# Patient Record
Sex: FEMALE | Race: WHITE | NOT HISPANIC OR LATINO | Employment: OTHER | URBAN - METROPOLITAN AREA
[De-identification: names, ages, dates, MRNs, and addresses within clinical notes are randomized per-mention and may not be internally consistent; named-entity substitution may affect disease eponyms.]

---

## 2024-10-08 ENCOUNTER — OFFICE VISIT (OUTPATIENT)
Age: 84
End: 2024-10-08
Payer: MEDICARE

## 2024-10-08 VITALS
DIASTOLIC BLOOD PRESSURE: 68 MMHG | SYSTOLIC BLOOD PRESSURE: 134 MMHG | RESPIRATION RATE: 17 BRPM | TEMPERATURE: 98.1 F | HEIGHT: 66 IN | WEIGHT: 139.4 LBS | BODY MASS INDEX: 22.4 KG/M2 | HEART RATE: 72 BPM | OXYGEN SATURATION: 97 %

## 2024-10-08 DIAGNOSIS — Z23 ENCOUNTER FOR IMMUNIZATION: ICD-10-CM

## 2024-10-08 DIAGNOSIS — D50.0 IRON DEFICIENCY ANEMIA DUE TO CHRONIC BLOOD LOSS: ICD-10-CM

## 2024-10-08 DIAGNOSIS — I10 PRIMARY HYPERTENSION: ICD-10-CM

## 2024-10-08 DIAGNOSIS — C44.92 CANCER OF SKIN, SQUAMOUS CELL: ICD-10-CM

## 2024-10-08 DIAGNOSIS — Z78.0 POSTMENOPAUSAL: ICD-10-CM

## 2024-10-08 DIAGNOSIS — C18.2 MALIGNANT NEOPLASM OF ASCENDING COLON (HCC): ICD-10-CM

## 2024-10-08 DIAGNOSIS — R73.03 PREDIABETES: ICD-10-CM

## 2024-10-08 DIAGNOSIS — Z90.49 H/O RIGHT HEMICOLECTOMY: ICD-10-CM

## 2024-10-08 DIAGNOSIS — Z76.89 ENCOUNTER TO ESTABLISH CARE: Primary | ICD-10-CM

## 2024-10-08 DIAGNOSIS — R91.1 NODULE OF LOWER LOBE OF LEFT LUNG: ICD-10-CM

## 2024-10-08 DIAGNOSIS — E78.49 OTHER HYPERLIPIDEMIA: ICD-10-CM

## 2024-10-08 DIAGNOSIS — N18.30 STAGE 3 CHRONIC KIDNEY DISEASE, UNSPECIFIED WHETHER STAGE 3A OR 3B CKD (HCC): ICD-10-CM

## 2024-10-08 PROBLEM — E04.1 THYROID NODULE: Status: ACTIVE | Noted: 2024-10-08

## 2024-10-08 PROCEDURE — 99204 OFFICE O/P NEW MOD 45 MIN: CPT | Performed by: STUDENT IN AN ORGANIZED HEALTH CARE EDUCATION/TRAINING PROGRAM

## 2024-10-08 PROCEDURE — G0008 ADMIN INFLUENZA VIRUS VAC: HCPCS | Performed by: STUDENT IN AN ORGANIZED HEALTH CARE EDUCATION/TRAINING PROGRAM

## 2024-10-08 PROCEDURE — 90662 IIV NO PRSV INCREASED AG IM: CPT | Performed by: STUDENT IN AN ORGANIZED HEALTH CARE EDUCATION/TRAINING PROGRAM

## 2024-10-08 RX ORDER — METOPROLOL SUCCINATE 25 MG/1
TABLET, EXTENDED RELEASE ORAL
COMMUNITY
Start: 2024-09-16

## 2024-10-08 RX ORDER — FAMOTIDINE 20 MG/1
TABLET, FILM COATED ORAL
COMMUNITY
Start: 2024-08-26

## 2024-10-08 RX ORDER — ACETAMINOPHEN 500 MG
500 TABLET ORAL EVERY 6 HOURS PRN
COMMUNITY

## 2024-10-08 RX ORDER — IMIPRAMINE HCL 25 MG
TABLET ORAL
COMMUNITY
Start: 2024-09-03

## 2024-10-08 RX ORDER — MULTIVIT-MIN/IRON/FOLIC ACID/K 18-600-40
CAPSULE ORAL
COMMUNITY

## 2024-10-08 RX ORDER — SIMVASTATIN 80 MG
80 TABLET ORAL
COMMUNITY

## 2024-10-08 RX ORDER — UBIDECARENONE 75 MG
CAPSULE ORAL DAILY
COMMUNITY

## 2024-10-08 RX ORDER — LOSARTAN POTASSIUM 25 MG/1
25 TABLET ORAL DAILY
COMMUNITY

## 2024-10-08 RX ORDER — LATANOPROST 50 UG/ML
SOLUTION/ DROPS OPHTHALMIC
COMMUNITY
Start: 2024-08-13

## 2024-10-08 RX ORDER — POTASSIUM CHLORIDE 750 MG/1
TABLET, EXTENDED RELEASE ORAL
COMMUNITY
Start: 2024-08-13

## 2024-10-08 RX ORDER — MULTIVITAMIN WITH IRON
250 TABLET ORAL DAILY
COMMUNITY

## 2024-10-08 RX ORDER — LANOLIN ALCOHOL/MO/W.PET/CERES
CREAM (GRAM) TOPICAL
COMMUNITY

## 2024-10-08 NOTE — PROGRESS NOTES
"Ambulatory Visit  Name: Delores Crespo      : 1940      MRN: [id number]  Encounter Provider: Salo Calvillo MD  Encounter Date: 10/8/2024   Encounter department: [facility]IMARY [facility] [place]    Assessment & Plan  Encounter to establish care  Immunizations: Influenza vaccine given today  Labs: CBC, CMP, A1c, lipids, TSH  Screening: DEXA ordered        Malignant neoplasm of ascending colon (HCC)  Patient s/p right hemicolectomy for ascending colon adenocarcinoma last year. Needs to establish with Oncology and GI in this area.    Orders:    Ambulatory Referral to Hematology / Oncology; Future    Ambulatory Referral to Gastroenterology; Future    H/O right hemicolectomy    Orders:    Ambulatory Referral to Gastroenterology; Future    Iron deficiency anemia due to chronic blood loss  In the setting of colon cancer, which has been resected.    Orders:    CBC and differential; Future    Primary hypertension  BP appropriate on losartan 25 mg qd, Toprol XL 25 mg qd. Continue same.    Orders:    Comprehensive metabolic panel; Future    TSH, 3rd generation with Free T4 reflex; Future    Other hyperlipidemia  Currently on simvastatin 80 mg qd. Continue statin. Recheck lipid panel.    Orders:    Lipid panel; Future    Prediabetes  Patient reports recent A1c's have been 5.9-6.2. currently on metformin 500 mg qd. Recheck A1c. Continue metformin.    Orders:    Hemoglobin A1C; Future    Comprehensive metabolic panel; Future    Stage 3 chronic kidney disease, unspecified whether stage 3a or 3b CKD (HCC)  No results found for: \"EGFR\", \"CREATININE\"    Recheck CMP.       Nodule of lower lobe of left lung  Patient came with office visit note from her Oncologist in [place] from last year. It was reported that she had left lower lobe lung nodule that they recommended follow up CT scan in 2024. CT chest ordered.    Orders:    CT chest wo contrast; Future    Cancer of skin, squamous cell  Reports chronic history " and that she followed with Dermatology in the past who would freeze/burn lesions at her yearly visits. Will refer to Dermatology to establish for yearly skin checks.    Orders:    Ambulatory Referral to Dermatology; Future    Postmenopausal    Orders:    DXA bone density spine hip and pelvis; Future    Encounter for immunization    Orders:    influenza vaccine, high-dose, PF 0.5 mL (Fluzone High Dose)      Depression Screening and Follow-up Plan: Patient was screened for depression during today's encounter. They screened negative with a PHQ-2 score of 0.    Falls Plan of Care: balance, strength, and gait training instructions were provided.       History of Present Illness     Delores Crespo is an 84 yo F with PMH of colon cancer s/p right hemicolectomy, HTN, HLD, prediabetes, CKD 3, SCC of the skin, and left lower lobe nodule who presents today to establish care. Recently moved here from [place]. She had AWV done in December 2023. She was diagnosed with colon cancer last year and underwent right hemicolectomy. She recovered well after this procedure. She has no specific complaints today.          Review of Systems   Constitutional:  Negative for appetite change, chills and fever.   HENT:  Negative for congestion and sore throat.    Eyes:  Negative for visual disturbance.   Respiratory:  Negative for cough and shortness of breath.    Cardiovascular:  Negative for chest pain and leg swelling.   Gastrointestinal:  Negative for abdominal pain, constipation, diarrhea and nausea.   Genitourinary:  Negative for difficulty urinating and dysuria.   Musculoskeletal:  Negative for arthralgias and myalgias.   Skin:  Negative for rash.   Neurological:  Negative for dizziness, light-headedness and headaches.   Psychiatric/Behavioral:  Negative for confusion.      Medical History Reviewed by provider this encounter:  Tobacco  Allergies  Meds  Problems  Med Hx  Surg Hx  Fam Hx       Past Medical History   Past Medical  History:   Diagnosis Date    Colon cancer (HCC)     august 2024    H/O blood clots     High cholesterol      Past Surgical History:   Procedure Laterality Date    COLONOSCOPY      LAPAROSCOPIC COLON RESECTION      TUBAL LIGATION       Family History   Problem Relation Age of Onset    Hodgkin's lymphoma Mother         non hodgkin    Heart attack Father     No Known Problems Brother     Parkinsonism Brother     No Known Problems Sister      Current Outpatient Medications on File Prior to Visit   Medication Sig Dispense Refill    acetaminophen (TYLENOL) 500 mg tablet Take 500 mg by mouth every 6 (six) hours as needed for mild pain      Cholecalciferol (Vitamin D) 50 MCG (2000 UT) CAPS Take by mouth      cyanocobalamin (VITAMIN B-12) 100 mcg tablet Take by mouth daily      famotidine (PEPCID) 20 mg tablet       imipramine (TOFRANIL) 25 mg tablet       latanoprost (XALATAN) 0.005 % ophthalmic solution       losartan (COZAAR) 25 mg tablet Take 25 mg by mouth daily      Magnesium 250 MG TABS Take 250 mg by mouth in the morning      melatonin 3 mg Take by mouth daily at bedtime      metFORMIN (GLUCOPHAGE) 500 mg tablet Take 500 mg by mouth daily with breakfast      metoprolol succinate (TOPROL-XL) 25 mg 24 hr tablet       potassium chloride (Klor-Con) 10 mEq tablet       simvastatin (ZOCOR) 80 mg tablet Take 80 mg by mouth daily at bedtime      timolol (BETIMOL) 0.25 % ophthalmic solution Administer 1 drop to both eyes daily       No current facility-administered medications on file prior to visit.     Allergies   Allergen Reactions    Alphagan [Brimonidine] Other (See Comments)     Doesn't remember    Baytril [Enrofloxacin] Other (See Comments)     Pt don't remember reaction      Current Outpatient Medications on File Prior to Visit   Medication Sig Dispense Refill    acetaminophen (TYLENOL) 500 mg tablet Take 500 mg by mouth every 6 (six) hours as needed for mild pain      Cholecalciferol (Vitamin D) 50 MCG (2000 UT) CAPS  "Take by mouth      cyanocobalamin (VITAMIN B-12) 100 mcg tablet Take by mouth daily      famotidine (PEPCID) 20 mg tablet       imipramine (TOFRANIL) 25 mg tablet       latanoprost (XALATAN) 0.005 % ophthalmic solution       losartan (COZAAR) 25 mg tablet Take 25 mg by mouth daily      Magnesium 250 MG TABS Take 250 mg by mouth in the morning      melatonin 3 mg Take by mouth daily at bedtime      metFORMIN (GLUCOPHAGE) 500 mg tablet Take 500 mg by mouth daily with breakfast      metoprolol succinate (TOPROL-XL) 25 mg 24 hr tablet       potassium chloride (Klor-Con) 10 mEq tablet       simvastatin (ZOCOR) 80 mg tablet Take 80 mg by mouth daily at bedtime      timolol (BETIMOL) 0.25 % ophthalmic solution Administer 1 drop to both eyes daily       No current facility-administered medications on file prior to visit.      Social History     Tobacco Use    Smoking status: Former     Current packs/day: 0.00     Types: Cigarettes     Quit date: 10/8/1964     Years since quittin.0     Passive exposure: Never    Smokeless tobacco: Never   Vaping Use    Vaping status: Never Used   Substance and Sexual Activity    Alcohol use: Not Currently    Drug use: Not Currently     Types: Marijuana    Sexual activity: Not Currently         Objective     /68 (BP Location: Left arm, Patient Position: Sitting, Cuff Size: Standard)   Pulse 72   Temp 98.1 °F (36.7 °C) (Tympanic)   Resp 17   Ht 5' 6\" (1.676 m)   Wt 63.2 kg (139 lb 6.4 oz)   SpO2 97%   BMI 22.50 kg/m²     Physical Exam  Constitutional:       General: She is not in acute distress.  Eyes:      Conjunctiva/sclera: Conjunctivae normal.   Cardiovascular:      Rate and Rhythm: Normal rate and regular rhythm.      Heart sounds: Normal heart sounds.   Pulmonary:      Effort: Pulmonary effort is normal.      Breath sounds: Normal breath sounds.   Abdominal:      General: There is no distension.      Tenderness: There is no abdominal tenderness.   Musculoskeletal:      " Right lower leg: No edema.      Left lower leg: No edema.   Skin:     General: Skin is warm and dry.   Neurological:      Mental Status: She is alert.   Psychiatric:         Speech: Speech normal.         Behavior: Behavior normal. Behavior is cooperative.

## 2024-10-08 NOTE — ASSESSMENT & PLAN NOTE
In the setting of colon cancer, which has been resected.    Orders:    CBC and differential; Future

## 2024-10-08 NOTE — ASSESSMENT & PLAN NOTE
BP appropriate on losartan 25 mg qd, Toprol XL 25 mg qd. Continue same.    Orders:    Comprehensive metabolic panel; Future    TSH, 3rd generation with Free T4 reflex; Future

## 2024-10-08 NOTE — ASSESSMENT & PLAN NOTE
Patient reports recent A1c's have been 5.9-6.2. currently on metformin 500 mg qd. Recheck A1c. Continue metformin.    Orders:    Hemoglobin A1C; Future    Comprehensive metabolic panel; Future

## 2024-10-08 NOTE — ASSESSMENT & PLAN NOTE
Patient came with office visit note from her Oncologist in [place] from last year. It was reported that she had left lower lobe lung nodule that they recommended follow up CT scan in January 2024. CT chest ordered.    Orders:    CT chest wo contrast; Future

## 2024-10-08 NOTE — ASSESSMENT & PLAN NOTE
Patient s/p right hemicolectomy for ascending colon adenocarcinoma last year. Needs to establish with Oncology and GI in this area.    Orders:    Ambulatory Referral to Hematology / Oncology; Future    Ambulatory Referral to Gastroenterology; Future

## 2024-10-08 NOTE — ASSESSMENT & PLAN NOTE
Reports chronic history and that she followed with Dermatology in the past who would freeze/burn lesions at her yearly visits. Will refer to Dermatology to establish for yearly skin checks.    Orders:    Ambulatory Referral to Dermatology; Future

## 2024-10-08 NOTE — ASSESSMENT & PLAN NOTE
Currently on simvastatin 80 mg qd. Continue statin. Recheck lipid panel.    Orders:    Lipid panel; Future

## 2024-10-10 ENCOUNTER — DOCUMENTATION (OUTPATIENT)
Dept: HEMATOLOGY ONCOLOGY | Facility: CLINIC | Age: 84
End: 2024-10-10

## 2024-10-11 NOTE — PROGRESS NOTES
Noted by PCP pt's hx of colon cancer s/p hemicolectomy. She is being referred to medical oncology and GI for surveillance follow up. Records will need to be retrieved for a history of cancer pt.

## 2024-10-31 ENCOUNTER — HOSPITAL ENCOUNTER (OUTPATIENT)
Dept: CT IMAGING | Facility: HOSPITAL | Age: 84
End: 2024-10-31
Attending: STUDENT IN AN ORGANIZED HEALTH CARE EDUCATION/TRAINING PROGRAM
Payer: MEDICARE

## 2024-10-31 DIAGNOSIS — R91.1 NODULE OF LOWER LOBE OF LEFT LUNG: ICD-10-CM

## 2024-10-31 PROCEDURE — 71250 CT THORAX DX C-: CPT

## 2024-11-04 ENCOUNTER — OFFICE VISIT (OUTPATIENT)
Dept: GASTROENTEROLOGY | Facility: CLINIC | Age: 84
End: 2024-11-04
Payer: MEDICARE

## 2024-11-04 VITALS
DIASTOLIC BLOOD PRESSURE: 80 MMHG | TEMPERATURE: 97.8 F | HEIGHT: 66 IN | OXYGEN SATURATION: 94 % | RESPIRATION RATE: 18 BRPM | HEART RATE: 72 BPM | WEIGHT: 141 LBS | SYSTOLIC BLOOD PRESSURE: 140 MMHG | BODY MASS INDEX: 22.66 KG/M2

## 2024-11-04 DIAGNOSIS — Z90.49 H/O RIGHT HEMICOLECTOMY: ICD-10-CM

## 2024-11-04 DIAGNOSIS — R19.7 DIARRHEA, UNSPECIFIED TYPE: Primary | ICD-10-CM

## 2024-11-04 DIAGNOSIS — C18.2 MALIGNANT NEOPLASM OF ASCENDING COLON (HCC): ICD-10-CM

## 2024-11-04 PROCEDURE — 99203 OFFICE O/P NEW LOW 30 MIN: CPT | Performed by: INTERNAL MEDICINE

## 2024-11-04 RX ORDER — TIMOLOL MALEATE 2.5 MG/ML
SOLUTION/ DROPS OPHTHALMIC
COMMUNITY
Start: 2024-10-24

## 2024-11-04 RX ORDER — LEVOTHYROXINE SODIUM 75 MCG
TABLET ORAL
COMMUNITY
Start: 2024-10-09 | End: 2024-11-06 | Stop reason: SDUPTHER

## 2024-11-04 NOTE — PROGRESS NOTES
[facility] - Outpatient Note  Delores Crespo 83 y.o. female MRN: [id number]  Encounter: [id number]      ASSESSMENT AND PLAN:    Delores Crespo is a 83 y.o. old pleasant female with PMH of ascending colon cancer s/p resection, hypertension, prediabetes who presents for history of colon cancer and change in bowel movements    History of right-sided colon cancer-status post colon resection and chemotherapy in 2023.  Repeat colonoscopy earlier this year was negative.  Repeat colonoscopy in 2027    Softer stool-2 softer bowels per day.  She was concerned this is a cancer sign.  I told her this does not indicate recurrence of her cancer.  Check stool enteric bacterial panel and C. Difficile  Start psyllium fiber  Start probiotic  Consider dairy free trial in the future          1. Malignant neoplasm of ascending colon (HCC)    - Ambulatory Referral to Gastroenterology    2. H/O right hemicolectomy    - Ambulatory Referral to Gastroenterology    3. Diarrhea, unspecified type    - Stool Enteric Bacterial Panel by PCR; Future  - Clostridium difficile toxin by PCR with EIA; Future    ______________________________________________________________________    SUBJECTIVE: Patient denies abdominal pain nausea vomiting constipation diarrhea.  She does report 2 soft bowel moods per day and previously she had 1 formed bowel movement per day.  This is concerning for her.  No nausea vomiting.  No weight loss dysphagia odynophagia.  No rectal bleeding.  Reports colonoscopy in 2023 that diagnosed her with colon cancer status post right hemicolectomy and chemotherapy.  She had 1 positive lymph node.  Biopsy showed moderately differentiated adenocarcinoma.  Otherwise she is doing well overall.      I reviewed prior external notes    I reviewed previous lab results and images      REVIEW OF SYSTEMS:     REVIEW OF ALL OTHER SYSTEMS IS OTHERWISE NEGATIVE.      Historical Information   Past Medical History:   Diagnosis  Date    Colon cancer (HCC)     2024    Diverticulitis of colon     GERD (gastroesophageal reflux disease)     H/O blood clots     High cholesterol     Hypertension      Past Surgical History:   Procedure Laterality Date    COLONOSCOPY      COLONOSCOPY      LAPAROSCOPIC COLON RESECTION      TUBAL LIGATION       Social History   Social History     Substance and Sexual Activity   Alcohol Use Not Currently     Social History     Substance and Sexual Activity   Drug Use Not Currently    Types: Marijuana     Social History     Tobacco Use   Smoking Status Former    Current packs/day: 0.00    Average packs/day: 0.3 packs/day for 5.0 years (1.3 ttl pk-yrs)    Types: Cigarettes    Start date: 1959    Quit date: 10/8/1964    Years since quittin.1    Passive exposure: Never   Smokeless Tobacco Never     Family History   Problem Relation Age of Onset    Hodgkin's lymphoma Mother         non hodgkin    Asthma Mother     Cancer Mother     Heart attack Father     Heart disease Father     No Known Problems Brother     Parkinsonism Brother     No Known Problems Sister        Meds/Allergies       Current Outpatient Medications:     acetaminophen (TYLENOL) 500 mg tablet    Cholecalciferol (Vitamin D) 50 MCG (2000) CAPS    cyanocobalamin (VITAMIN B-12) 100 mcg tablet    famotidine (PEPCID) 20 mg tablet    imipramine (TOFRANIL) 25 mg tablet    latanoprost (XALATAN) 0.005 % ophthalmic solution    losartan (COZAAR) 25 mg tablet    Magnesium 250 MG TABS    melatonin 3 mg    metFORMIN (GLUCOPHAGE) 500 mg tablet    metoprolol succinate (TOPROL-XL) 25 mg 24 hr tablet    potassium chloride (Klor-Con) 10 mEq tablet    simvastatin (ZOCOR) 80 mg tablet    Synthroid 75 MCG tablet    timolol (BETIMOL) 0.25 % ophthalmic solution    timolol (TIMOPTIC) 0.25 % ophthalmic solution    Allergies   Allergen Reactions    Alphagan [Brimonidine] Other (See Comments)     Doesn't remember    Baytril [Enrofloxacin] Other (See Comments)      "Pt don't remember reaction           Objective     Blood pressure 140/80, pulse 72, temperature 97.8 °F (36.6 °C), temperature source Tympanic, resp. rate 18, height 5' 6\" (1.676 m), weight 64 kg (141 lb), SpO2 94%. Body mass index is 22.76 kg/m².      PHYSICAL EXAM:      General Appearance:   Alert, cooperative, no distress   HEENT:   Normocephalic, atraumatic, anicteric.     Neck:  Supple, symmetrical, trachea midline   Lungs:   Clear to auscultation bilaterally; no rales, rhonchi or wheezing; respirations unlabored    Heart::   Regular rate and rhythm; no murmur, rub, or gallop.   Abdomen:   Soft, non-tender, non-distended; normal bowel sounds; no masses, no organomegaly    Genitalia:   Deferred    Rectal:   Deferred    Extremities:  No cyanosis, clubbing or edema    Pulses:  2+ and symmetric    Skin:  No jaundice, rashes, or lesions    Lymph nodes:  No palpable cervical lymphadenopathy        Lab Results:   No visits with results within 1 Day(s) from this visit.   Latest known visit with results is:   No results found for any previous visit.         Radiology Results:   No results found.    "

## 2024-11-05 ENCOUNTER — TELEPHONE (OUTPATIENT)
Age: 84
End: 2024-11-05

## 2024-11-05 ENCOUNTER — HOSPITAL ENCOUNTER (OUTPATIENT)
Age: 84
Discharge: HOME | End: 2024-11-05
Payer: MEDICARE

## 2024-11-05 ENCOUNTER — APPOINTMENT (OUTPATIENT)
Age: 84
End: 2024-11-05
Payer: MEDICARE

## 2024-11-05 VITALS — WEIGHT: 137 LBS | HEIGHT: 64 IN | BODY MASS INDEX: 23.39 KG/M2

## 2024-11-05 DIAGNOSIS — R73.03 PREDIABETES: ICD-10-CM

## 2024-11-05 DIAGNOSIS — D50.0 IRON DEFICIENCY ANEMIA DUE TO CHRONIC BLOOD LOSS: ICD-10-CM

## 2024-11-05 DIAGNOSIS — R19.7 DIARRHEA, UNSPECIFIED TYPE: ICD-10-CM

## 2024-11-05 DIAGNOSIS — E78.49 OTHER HYPERLIPIDEMIA: ICD-10-CM

## 2024-11-05 DIAGNOSIS — I10 PRIMARY HYPERTENSION: ICD-10-CM

## 2024-11-05 DIAGNOSIS — Z78.0 POSTMENOPAUSAL: ICD-10-CM

## 2024-11-05 LAB
ALBUMIN SERPL BCG-MCNC: 4 G/DL (ref 3.5–5)
ALP SERPL-CCNC: 67 U/L (ref 34–104)
ALT SERPL W P-5'-P-CCNC: 11 U/L (ref 7–52)
ANION GAP SERPL CALCULATED.3IONS-SCNC: 10 MMOL/L (ref 4–13)
AST SERPL W P-5'-P-CCNC: 19 U/L (ref 13–39)
BASOPHILS # BLD AUTO: 0.06 THOUSANDS/ΜL (ref 0–0.1)
BASOPHILS NFR BLD AUTO: 1 % (ref 0–1)
BILIRUB SERPL-MCNC: 0.52 MG/DL (ref 0.2–1)
BUN SERPL-MCNC: 16 MG/DL (ref 5–25)
CALCIUM SERPL-MCNC: 9.3 MG/DL (ref 8.4–10.2)
CHLORIDE SERPL-SCNC: 104 MMOL/L (ref 96–108)
CHOLEST SERPL-MCNC: 170 MG/DL
CO2 SERPL-SCNC: 28 MMOL/L (ref 21–32)
CREAT SERPL-MCNC: 0.8 MG/DL (ref 0.6–1.3)
EOSINOPHIL # BLD AUTO: 0.1 THOUSAND/ΜL (ref 0–0.61)
EOSINOPHIL NFR BLD AUTO: 1 % (ref 0–6)
ERYTHROCYTE [DISTWIDTH] IN BLOOD BY AUTOMATED COUNT: 14.2 % (ref 11.6–15.1)
EST. AVERAGE GLUCOSE BLD GHB EST-MCNC: 126 MG/DL
GFR SERPL CREATININE-BSD FRML MDRD: 68 ML/MIN/1.73SQ M
GLUCOSE P FAST SERPL-MCNC: 117 MG/DL (ref 65–99)
HBA1C MFR BLD: 6 %
HCT VFR BLD AUTO: 41.1 % (ref 34.8–46.1)
HDLC SERPL-MCNC: 68 MG/DL
HGB BLD-MCNC: 13.7 G/DL (ref 11.5–15.4)
IMM GRANULOCYTES # BLD AUTO: 0.02 THOUSAND/UL (ref 0–0.2)
IMM GRANULOCYTES NFR BLD AUTO: 0 % (ref 0–2)
LDLC SERPL CALC-MCNC: 77 MG/DL (ref 0–100)
LYMPHOCYTES # BLD AUTO: 2.23 THOUSANDS/ΜL (ref 0.6–4.47)
LYMPHOCYTES NFR BLD AUTO: 28 % (ref 14–44)
MCH RBC QN AUTO: 31.9 PG (ref 26.8–34.3)
MCHC RBC AUTO-ENTMCNC: 33.3 G/DL (ref 31.4–37.4)
MCV RBC AUTO: 96 FL (ref 82–98)
MONOCYTES # BLD AUTO: 0.57 THOUSAND/ΜL (ref 0.17–1.22)
MONOCYTES NFR BLD AUTO: 7 % (ref 4–12)
NEUTROPHILS # BLD AUTO: 5.09 THOUSANDS/ΜL (ref 1.85–7.62)
NEUTS SEG NFR BLD AUTO: 63 % (ref 43–75)
NONHDLC SERPL-MCNC: 102 MG/DL
NRBC BLD AUTO-RTO: 0 /100 WBCS
PLATELET # BLD AUTO: 343 THOUSANDS/UL (ref 149–390)
PMV BLD AUTO: 10.5 FL (ref 8.9–12.7)
POTASSIUM SERPL-SCNC: 4.4 MMOL/L (ref 3.5–5.3)
PROT SERPL-MCNC: 7 G/DL (ref 6.4–8.4)
RBC # BLD AUTO: 4.29 MILLION/UL (ref 3.81–5.12)
SODIUM SERPL-SCNC: 142 MMOL/L (ref 135–147)
T4 FREE SERPL-MCNC: 0.88 NG/DL (ref 0.61–1.12)
TRIGL SERPL-MCNC: 125 MG/DL
TSH SERPL DL<=0.05 MIU/L-ACNC: 6.18 UIU/ML (ref 0.45–4.5)
WBC # BLD AUTO: 8.07 THOUSAND/UL (ref 4.31–10.16)

## 2024-11-05 PROCEDURE — 84443 ASSAY THYROID STIM HORMONE: CPT

## 2024-11-05 PROCEDURE — 77080 DXA BONE DENSITY AXIAL: CPT

## 2024-11-05 PROCEDURE — 80053 COMPREHEN METABOLIC PANEL: CPT

## 2024-11-05 PROCEDURE — 84439 ASSAY OF FREE THYROXINE: CPT

## 2024-11-05 PROCEDURE — 85025 COMPLETE CBC W/AUTO DIFF WBC: CPT

## 2024-11-05 PROCEDURE — 36415 COLL VENOUS BLD VENIPUNCTURE: CPT

## 2024-11-05 PROCEDURE — 80061 LIPID PANEL: CPT

## 2024-11-05 PROCEDURE — 83036 HEMOGLOBIN GLYCOSYLATED A1C: CPT

## 2024-11-05 NOTE — TELEPHONE ENCOUNTER
Called patient and got number for where she had the CT scan and they said they will fax it over to us Dr. Davidson in [facility] [phone]

## 2024-11-05 NOTE — TELEPHONE ENCOUNTER
----- Message from Salo Calvillo MD sent at 11/5/2024  3:18 PM EST -----  Please let patient know that her CT scan showed some small non-suspicious in her left and right lungs. See if patient can get a hold of her previous CT scan of the lung from [place] so that we can compare findings.

## 2024-11-06 ENCOUNTER — APPOINTMENT (OUTPATIENT)
Age: 84
End: 2024-11-06
Payer: MEDICARE

## 2024-11-06 ENCOUNTER — TELEPHONE (OUTPATIENT)
Age: 84
End: 2024-11-06

## 2024-11-06 DIAGNOSIS — E03.9 ACQUIRED HYPOTHYROIDISM: Primary | ICD-10-CM

## 2024-11-06 DIAGNOSIS — K76.9 LIVER LESION, RIGHT LOBE: ICD-10-CM

## 2024-11-06 PROCEDURE — 87505 NFCT AGENT DETECTION GI: CPT

## 2024-11-06 RX ORDER — LEVOTHYROXINE SODIUM 88 UG/1
88 TABLET ORAL DAILY
Qty: 90 TABLET | Refills: 1 | Status: SHIPPED | OUTPATIENT
Start: 2024-11-06 | End: 2025-02-04

## 2024-11-06 NOTE — TELEPHONE ENCOUNTER
----- Message from Salo Calvillo MD sent at 11/6/2024 10:32 AM EST -----  Patient called with results. Will increase dose of levothyroxine to 88 mcg and recheck TSH in 6 weeks. A1c and FBG in prediabetic range. Also went over report of CT scan that was faxed over to us. MRI abdomen recommended to follow up on liver lesions   - will place order. Repeat thyroid imaging was recommended for thyroid nodules but patient reports that she did have this done about 5 months ago and was unremarkable

## 2024-11-06 NOTE — TELEPHONE ENCOUNTER
----- Message from Salo Calvillo MD sent at 11/6/2024  3:35 PM EST -----  Please let patient know her DEXA showed osteopenia (low bone mass). She should continue taking her vit D and supplement Ca 1000 mg daily. Just make sure she spaces out the calcium and her levothyroxine by 4 hours

## 2024-11-07 LAB
C COLI+JEJUNI TUF STL QL NAA+PROBE: NEGATIVE
C DIFF TOX GENS STL QL NAA+PROBE: NEGATIVE
EC STX1+STX2 GENES STL QL NAA+PROBE: NEGATIVE
SALMONELLA SP SPAO STL QL NAA+PROBE: NEGATIVE
SHIGELLA SP+EIEC IPAH STL QL NAA+PROBE: NEGATIVE

## 2024-11-12 ENCOUNTER — APPOINTMENT (OUTPATIENT)
Dept: LAB | Facility: HOSPITAL | Age: 84
End: 2024-11-12
Attending: INTERNAL MEDICINE
Payer: MEDICARE

## 2024-11-12 ENCOUNTER — CONSULT (OUTPATIENT)
Dept: HEMATOLOGY ONCOLOGY | Facility: CLINIC | Age: 84
End: 2024-11-12
Payer: MEDICARE

## 2024-11-12 VITALS
BODY MASS INDEX: 23.39 KG/M2 | SYSTOLIC BLOOD PRESSURE: 130 MMHG | DIASTOLIC BLOOD PRESSURE: 76 MMHG | OXYGEN SATURATION: 98 % | RESPIRATION RATE: 18 BRPM | WEIGHT: 137 LBS | HEIGHT: 64 IN | HEART RATE: 81 BPM | TEMPERATURE: 97.6 F

## 2024-11-12 DIAGNOSIS — C18.2 MALIGNANT NEOPLASM OF ASCENDING COLON (HCC): ICD-10-CM

## 2024-11-12 DIAGNOSIS — I10 PRIMARY HYPERTENSION: ICD-10-CM

## 2024-11-12 DIAGNOSIS — C18.2 MALIGNANT NEOPLASM OF ASCENDING COLON (HCC): Primary | ICD-10-CM

## 2024-11-12 DIAGNOSIS — C44.92 CANCER OF SKIN, SQUAMOUS CELL: ICD-10-CM

## 2024-11-12 DIAGNOSIS — Z90.49 H/O RIGHT HEMICOLECTOMY: ICD-10-CM

## 2024-11-12 LAB
ALBUMIN SERPL BCG-MCNC: 3.9 G/DL (ref 3.5–5)
ALP SERPL-CCNC: 63 U/L (ref 34–104)
ALT SERPL W P-5'-P-CCNC: 9 U/L (ref 7–52)
ANION GAP SERPL CALCULATED.3IONS-SCNC: 6 MMOL/L (ref 4–13)
AST SERPL W P-5'-P-CCNC: 16 U/L (ref 13–39)
BASOPHILS # BLD AUTO: 0.07 THOUSANDS/ÂΜL (ref 0–0.1)
BASOPHILS NFR BLD AUTO: 1 % (ref 0–1)
BILIRUB SERPL-MCNC: 0.39 MG/DL (ref 0.2–1)
BUN SERPL-MCNC: 17 MG/DL (ref 5–25)
CALCIUM SERPL-MCNC: 9 MG/DL (ref 8.4–10.2)
CEA SERPL-MCNC: 1.1 NG/ML (ref 0–3)
CHLORIDE SERPL-SCNC: 103 MMOL/L (ref 96–108)
CO2 SERPL-SCNC: 30 MMOL/L (ref 21–32)
CREAT SERPL-MCNC: 0.82 MG/DL (ref 0.6–1.3)
EOSINOPHIL # BLD AUTO: 0.15 THOUSAND/ÂΜL (ref 0–0.61)
EOSINOPHIL NFR BLD AUTO: 2 % (ref 0–6)
ERYTHROCYTE [DISTWIDTH] IN BLOOD BY AUTOMATED COUNT: 13.3 % (ref 11.6–15.1)
GFR SERPL CREATININE-BSD FRML MDRD: 66 ML/MIN/1.73SQ M
GLUCOSE SERPL-MCNC: 123 MG/DL (ref 65–140)
HCT VFR BLD AUTO: 39.1 % (ref 34.8–46.1)
HGB BLD-MCNC: 12.6 G/DL (ref 11.5–15.4)
IMM GRANULOCYTES # BLD AUTO: 0.02 THOUSAND/UL (ref 0–0.2)
IMM GRANULOCYTES NFR BLD AUTO: 0 % (ref 0–2)
LYMPHOCYTES # BLD AUTO: 2.43 THOUSANDS/ÂΜL (ref 0.6–4.47)
LYMPHOCYTES NFR BLD AUTO: 36 % (ref 14–44)
MCH RBC QN AUTO: 30.4 PG (ref 26.8–34.3)
MCHC RBC AUTO-ENTMCNC: 32.2 G/DL (ref 31.4–37.4)
MCV RBC AUTO: 94 FL (ref 82–98)
MONOCYTES # BLD AUTO: 0.62 THOUSAND/ÂΜL (ref 0.17–1.22)
MONOCYTES NFR BLD AUTO: 9 % (ref 4–12)
NEUTROPHILS # BLD AUTO: 3.39 THOUSANDS/ÂΜL (ref 1.85–7.62)
NEUTS SEG NFR BLD AUTO: 52 % (ref 43–75)
NRBC BLD AUTO-RTO: 0 /100 WBCS
PLATELET # BLD AUTO: 321 THOUSANDS/UL (ref 149–390)
PMV BLD AUTO: 9.5 FL (ref 8.9–12.7)
POTASSIUM SERPL-SCNC: 4 MMOL/L (ref 3.5–5.3)
PROT SERPL-MCNC: 6.5 G/DL (ref 6.4–8.4)
RBC # BLD AUTO: 4.14 MILLION/UL (ref 3.81–5.12)
SODIUM SERPL-SCNC: 139 MMOL/L (ref 135–147)
WBC # BLD AUTO: 6.68 THOUSAND/UL (ref 4.31–10.16)

## 2024-11-12 PROCEDURE — 36415 COLL VENOUS BLD VENIPUNCTURE: CPT

## 2024-11-12 PROCEDURE — 80053 COMPREHEN METABOLIC PANEL: CPT

## 2024-11-12 PROCEDURE — 85025 COMPLETE CBC W/AUTO DIFF WBC: CPT

## 2024-11-12 PROCEDURE — 99205 OFFICE O/P NEW HI 60 MIN: CPT | Performed by: INTERNAL MEDICINE

## 2024-11-12 PROCEDURE — 82378 CARCINOEMBRYONIC ANTIGEN: CPT

## 2024-11-12 NOTE — PROGRESS NOTES
Hematology/Oncology Outpatient Office Note    Date of Service: 2024    [facility]      Reason for Consultation:   Chief Complaint   Patient presents with    Consult       Cancer Stage:  Cancer Staging   Malignant neoplasm of ascending colon (HCC)  Staging form: Colon and Rectum, AJCC 8th Edition  - Pathologic stage from 2023: Stage IIIB (pT3, pN1a, cM0) - Signed by David Edwards MD on 2024     Referral Physician: Salo Calvillo MD    Primary Care Physician:  Salo Calvillo MD     Nickname: Delores    Accompanied by daughter Alice.    Original ECO    Today's ECO    Goals and Barriers:  Current Goal: Minimize effects of disease burden, extend life.   Barriers to accomplishing this: None    Patient's Capacity to Self Care:  Patient is able to self care    ASSESSMENT & PLAN      Diagnosis ICD-10-CM Associated Orders   1. Malignant neoplasm of ascending colon (HCC)  C18.2 Ambulatory Referral to Hematology / Oncology     CBC and differential     Comprehensive metabolic panel     CEA     CT abdomen pelvis w contrast      2. Primary hypertension  I10       3. Cancer of skin, squamous cell  C44.92       4. H/O right hemicolectomy  Z90.49             This is a 83 y.o. c PMHx notable for history of stage IIIb colon cancer diagnosed in 2023 status post right hemicolectomy and adjuvant 5-FU treatment, being seen in consultation for establishing care and surveillance.  She moved from [place] 2-1/2 months ago.  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       Discussion of decision making  Oncology history updated, accordingly, during this visit  Goals of care/patient communication  I discussed with the patient the clinical course leading up to their cancer diagnosis. I reviewed relevant office notes, imaging reports and pathology result as well.  I told the patient that this is a  case of curable disease and what this means. We discussed that the goal of anti-cancer therapy is to provide best quality of life, extend overall survival, and progression free survival as shown in clinical trials.   I explained that as she has completed adjuvant chemotherapy now the neck step would be to continue surveillance with frequent monitoring of blood work and imaging studies.    We will also request medical records from the patient's oncologist Dr. Tsang in [place].     TNM/Staging At Diagnosis  TNM   Cancer Staging   Malignant neoplasm of ascending colon (HCC)  Staging form: Colon and Rectum, AJCC 8th Edition  - Pathologic stage from 8/4/2023: Stage IIIB (pT3, pN1a, cM0) - Signed by David Edwards MD on 11/12/2024       Treatment: Status post adjuvant 5-FU.    Labs  Diagnostics    CT CHEST WITHOUT IV CONTRAST     INDICATION: R91.1: Solitary pulmonary nodule.     COMPARISON: None.     TECHNIQUE: CT examination of the chest was performed without intravenous contrast. Multiplanar 2D reformatted images were created from the source data.     This examination, like all CT scans performed in the [facility], was performed utilizing techniques to minimize radiation dose exposure, including the use of iterative reconstruction and automated exposure control. Radiation dose length   product (DLP) for this visit: 209 mGy-cm     FINDINGS:     LUNGS: Few impacted distal airways in the lingula and right lower lobe (series 3, images 158-160).     0.3 cm solid left lower lobe nodule (series 3, image 165).     0.1 cm solid right upper lobe nodule (series 3, image 51).     0.1 cm linear left upper lobe nodule (series 3, image 41).     0.1 cm solid nodule in the periphery of the right upper lobe (series 3, image 63).     Few calcified granulomas measure up to 0.3 cm     PLEURA: Unremarkable.     HEART/GREAT VESSELS: Normal heart size. Coronary artery calcifications. Atherosclerotic calcifications of the  aorta. No thoracic aortic aneurysm.     MEDIASTINUM AND NABILA: Small hiatal hernia. No mediastinal or hilar lymphadenopathy. Calcified hilar and mediastinal lymph nodes.     CHEST WALL AND LOWER NECK: Thyromegaly. Incidental 1.7 cm left thyroid nodule identified on this CT. According to guidelines published in the February 2015 white paper on incidental thyroid nodules in the Journal of the American College of Radiology   (JACR), further evaluation with ultrasound may be considered.  However, as the majority of incidental thyroid nodules are benign and because small incidental thyroid malignancies typically demonstrate indolent behavior, consideration of the patient's   life expectancy and possible comorbidities should be taken into account prior to a decision to pursue further imaging.        VISUALIZED STRUCTURES IN THE UPPER ABDOMEN: Unremarkable.     OSSEOUS STRUCTURES: Spinal degenerative changes . No acute fracture or destructive osseous lesion.     IMPRESSION:  Few small pulmonary nodules measuring up to 0.3 cm which lacks suspicious features. Per review of the October 8, 2024 internal medicine note, a prior CT reported a left lower lobe pulmonary nodule. Review of prior imaging is advised to determine if and   when follow-up is required.       Discussion of decision making    I personally reviewed the following lab results, the image studies, pathology, other specialty/physicians consult notes and recommendations, and outside medical records. I had a lengthy discussion with the patient and shared the work-up findings. We discussed the diagnosis and management plan as below. I spent 68 minutes reviewing the records (labs, clinician notes, outside records, medical history, ordering medicine/tests/procedures, interpreting the imaging/labs previously done) and coordination of care as well as direct time with the patient today, of which greater than 50% of the time was spent in counseling and coordination of care  with the patient/family.    Plan/Labs  Request medical records from Dr. Tsang in [place].  Get CT abdomen and pelvis with contrast for surveillance  Request blood work including CBC, CMP, CEA.  Patient states she has been scheduled to get an MRI of the liver ordered by her PCP.  Regarding the thyroid nodules, patient states that she had biopsies done in [place] and the nodules were found to be benign.  Again we will request medical records.  RTC in 2 months.        Follow Up    All questions were answered to the patient's satisfaction during this encounter. The patient knows the contact information for our office and knows to reach out for any relevant concerns related to this encounter. They are to call for any temperature 100.4 or higher, new symptoms including but not restricted to shaking chills, decreased appetite, nausea, vomiting, diarrhea, increased fatigue, shortness of breath or chest pain, confusion, and not feeling the strength to come to the clinic. For all other listed problems and medical diagnosis in their chart - they are managed by PCP and/or other specialists, which the patient acknowledges. Thank you very much for your consultation and making us a part of this patient's care. We are continuing to follow closely with you. Please do not hesitate to reach out to me with any additional questions or concerns.    David Edwards MD  Hematology & Medical Oncology Staff Physician             Disclaimer: This document was prepared using Dragon [employer] One. If a word or phrase is confusing, or does not make sense, this is likely due to recognition error which was not discovered during this clinician's review. If you believe an error has occurred, please contact me through SeeMe service line for [email or web address].      ONCOLOGY HISTORY OF PRESENT ILLNESS        Oncology History   Malignant neoplasm of ascending colon (HCC)   8/4/2023 -  Cancer Staged    Staging form: Colon and Rectum, AJCC 8th Edition  -  Pathologic stage from 8/4/2023: Stage IIIB (pT3, pN1a, cM0) - Signed by David Edwards MD on 11/12/2024  Stage prefix: Initial diagnosis  Total positive nodes: 1       10/8/2024 Initial Diagnosis    Malignant neoplasm of ascending colon (HCC)           SUBJECTIVE  (INTERVAL HISTORY)     I have reviewed the relevant past medical, surgical, social and family history. I have also reviewed allergies and medications for this patient.    Review of Systems  Review of Systems   Constitutional:  Negative for activity change, appetite change, fatigue, fever and unexpected weight change.   HENT:  Negative for mouth sores, nosebleeds and sore throat.    Eyes:  Negative for redness and visual disturbance.   Respiratory:  Negative for cough, chest tightness, shortness of breath and wheezing.    Cardiovascular:  Negative for chest pain, palpitations and leg swelling.   Gastrointestinal:  Negative for abdominal pain, blood in stool, nausea and vomiting.   Genitourinary:  Negative for dysuria, flank pain and hematuria.   Musculoskeletal:  Negative for arthralgias, back pain, gait problem and myalgias.   Skin:  Negative for pallor, rash and wound.   Neurological:  Negative for dizziness, speech difficulty, weakness, light-headedness, numbness and headaches.        Hard of hearing   Hematological:  Negative for adenopathy. Does not bruise/bleed easily.   Psychiatric/Behavioral:  Negative for behavioral problems and confusion.          A 10-point review of system was performed, pertinent positive and negative were detailed as above. Otherwise, the 10-point review of system was negative.      Past Medical History:   Diagnosis Date    Colon cancer (HCC)     august 2024    Diverticulitis of colon     GERD (gastroesophageal reflux disease)     H/O blood clots     High cholesterol     Hypertension        Past Surgical History:   Procedure Laterality Date    COLONOSCOPY      COLONOSCOPY      LAPAROSCOPIC COLON RESECTION      TUBAL LIGATION          Family History   Problem Relation Age of Onset    Hodgkin's lymphoma Mother         non hodgkin    Asthma Mother     Cancer Mother     Heart attack Father     Heart disease Father     No Known Problems Brother     Parkinsonism Brother     No Known Problems Sister        Social History     Socioeconomic History    Marital status: /Civil Union     Spouse name: Not on file    Number of children: Not on file    Years of education: Not on file    Highest education level: Not on file   Occupational History    Not on file   Tobacco Use    Smoking status: Former     Current packs/day: 0.00     Average packs/day: 0.3 packs/day for 5.0 years (1.3 ttl pk-yrs)     Types: Cigarettes     Start date: 1959     Quit date: 10/8/1964     Years since quittin.1     Passive exposure: Never    Smokeless tobacco: Never   Vaping Use    Vaping status: Never Used   Substance and Sexual Activity    Alcohol use: Not Currently    Drug use: Not Currently     Types: Marijuana    Sexual activity: Not Currently     Partners: Male   Other Topics Concern    Not on file   Social History Narrative    Not on file     Social Determinants of Health     Financial Resource Strain: Not on file   Food Insecurity: Not on file   Transportation Needs: Not on file   Physical Activity: Inactive (10/8/2024)    Exercise Vital Sign     Days of Exercise per Week: 0 days     Minutes of Exercise per Session: 0 min   Stress: Not on file   Social Connections: Not on file   Intimate Partner Violence: Not on file   Housing Stability: Not on file       Allergies   Allergen Reactions    Alphagan [Brimonidine] Other (See Comments)     Doesn't remember    Baytril [Enrofloxacin] Other (See Comments)     Pt don't remember reaction       Current Outpatient Medications   Medication Sig Dispense Refill    acetaminophen (TYLENOL) 500 mg tablet Take 500 mg by mouth every 6 (six) hours as needed for mild pain      Cholecalciferol (Vitamin D) 50 MCG (2000)  CAPS Take by mouth      cyanocobalamin (VITAMIN B-12) 100 mcg tablet Take by mouth daily      famotidine (PEPCID) 20 mg tablet       imipramine (TOFRANIL) 25 mg tablet       latanoprost (XALATAN) 0.005 % ophthalmic solution       levothyroxine 88 mcg tablet Take 1 tablet (88 mcg total) by mouth daily 90 tablet 1    losartan (COZAAR) 25 mg tablet Take 25 mg by mouth daily      Magnesium 250 MG TABS Take 250 mg by mouth in the morning      melatonin 3 mg Take by mouth daily at bedtime      metFORMIN (GLUCOPHAGE) 500 mg tablet Take 500 mg by mouth daily with breakfast      metoprolol succinate (TOPROL-XL) 25 mg 24 hr tablet       potassium chloride (Klor-Con) 10 mEq tablet       simvastatin (ZOCOR) 80 mg tablet Take 80 mg by mouth daily at bedtime      timolol (TIMOPTIC) 0.25 % ophthalmic solution       timolol (BETIMOL) 0.25 % ophthalmic solution Administer 1 drop to both eyes daily       No current facility-administered medications for this visit.       (Not in a hospital admission)        Objective:     24 Hour Vitals Assessment:     Vitals:    11/12/24 1434   BP: 130/76   Pulse: 81   Resp: 18   Temp: 97.6 °F (36.4 °C)   SpO2: 98%       PHYSICIAN EXAM :    Physical Exam  Vitals and nursing note reviewed.   Constitutional:       General: She is not in acute distress.     Appearance: Normal appearance.   HENT:      Head: Normocephalic and atraumatic.      Mouth/Throat:      Mouth: Mucous membranes are moist.      Pharynx: Oropharynx is clear.   Eyes:      General: No scleral icterus.     Extraocular Movements: Extraocular movements intact.      Conjunctiva/sclera: Conjunctivae normal.   Cardiovascular:      Rate and Rhythm: Normal rate and regular rhythm.      Pulses: Normal pulses.      Heart sounds: No murmur heard.  Pulmonary:      Effort: Pulmonary effort is normal.      Breath sounds: Normal breath sounds. No wheezing, rhonchi or rales.   Abdominal:      General: Bowel sounds are normal.      Palpations: Abdomen  "is soft.      Tenderness: There is no abdominal tenderness.   Musculoskeletal:         General: No swelling or tenderness. Normal range of motion.      Cervical back: Neck supple.   Lymphadenopathy:      Cervical: No cervical adenopathy.   Skin:     General: Skin is warm and dry.      Coloration: Skin is not pale.      Findings: No bruising, erythema or rash.   Neurological:      General: No focal deficit present.      Mental Status: She is alert and oriented to person, place, and time.      Motor: No weakness.      Comments: Hard of hearing   Psychiatric:         Mood and Affect: Mood normal.         Behavior: Behavior normal.             DATA REVIEW:    Pathology Result:    No results found for: \"FINALDX\"     Image Results:   Image result are reviewed and documented in Hematology/Oncology history    DXA bone density spine hip and pelvis  Narrative: DXA SCAN    CLINICAL HISTORY: 83-year-old postmenopausal female.  OTHER RISK FACTORS: Limited mobility.    PHARMACOLOGIC THERAPY FOR OSTEOPOROSIS: None.    TECHNIQUE: Bone densitometry was performed using a   [employer] Horizon W bone densitometer.  Regions of interest appear properly placed.    COMPARISON: There are no prior DXA studies performed on this unit for comparison.    RESULTS:    LUMBAR SPINE  Level: L1-L3  (L4 vertebra excluded from analysis due to local structural abnormalities or artifact):  BMD: 0.968 gm/cm2  T-score: -0.5    LEFT TOTAL HIP:  BMD: 0.738 gm/cm2  T-score: -1.7    LEFT  FEMORAL NECK:  BMD: 0.636 gm/cm2  T score: -1.9  Impression: 1. Low bone mass (osteopenia).    2.  The 10 year risk of hip fracture is 4.7% with the 10 year risk of major osteoporotic fracture being 15% as calculated by the [employer] fracture risk assessment tool (FRAX, which is based on data generated by the WHO Collaborating [place]   for Metabolic Bone Diseases).    3.  The current NOF guidelines recommend treating patients with a T-score of -2.5 or less in the " lumbar spine or hips, or in post-menopausal women and men over the age of 50 with low bone mass (osteopenia) and a FRAX 10 year risk score of >3% for hip   fracture and/or >20% for major osteoporotic fracture.    4.  The NOF recommends follow-up DXA in 1-2 years after initiating therapy for osteoporosis and every 2 years thereafter. More frequent evaluation is appropriate for patients with conditions associated with rapid bone loss, such as glucocorticoid   therapy. The interval between DXA screenings may be longer for individuals without major risk factors and initial T-score in the normal or upper low bone mass range.    The FRAX algorithm has certain limitations:  -FRAX has not been validated in patients currently or previously treated with pharmacotherapy for osteoporosis.  In such patients, clinical judgment must be exercised in interpreting FRAX scores.  -Prior hip, vertebral and humeral fragility fractures appear to confer greater risk of subsequent fracture than fractures at other sites (this is especially true for individuals with severe vertebral fractures), but quantification of this incremental   risk is not possible with FRAX.  -FRAX underestimates fracture risk in patients with history of multiple fragility fractures.  -FRAX may underestimate fracture risk in patients with history of frequent falls.  -It is not appropriate to use FRAX to monitor treatment response.    WHO CLASSIFICATION:  Normal (a T-score of -1.0 or higher)  Low bone mineral density (a T-score of less than -1.0 but higher than -2.5)  Osteoporosis (a T-score of -2.5 or less)  Severe osteoporosis (a T-score of -2.5 or less with a fragility fracture)    Workstation performed: [id number]      LABS:  Lab data are reviewed and documented in [facility] history.       Lab Results   Component Value Date    HGB 13.7 11/05/2024    HCT 41.1 11/05/2024    MCV 96 11/05/2024     11/05/2024    WBC 8.07 11/05/2024    NRBC 0 11/05/2024     Lab  "Results   Component Value Date    K 4.4 11/05/2024     11/05/2024    CO2 28 11/05/2024    BUN 16 11/05/2024    CREATININE 0.80 11/05/2024    GLUF 117 (H) 11/05/2024    CALCIUM 9.3 11/05/2024    AST 19 11/05/2024    ALT 11 11/05/2024    ALKPHOS 67 11/05/2024    EGFR 68 11/05/2024       No results found for: \"IRON\", \"TIBC\", \"FERRITIN\"    No results found for: \"BSATWJPQ11\"    No results for input(s): \"WBC\", \"CREAT\", \"PLT\" in the last 72 hours.     By:  David Edwards MD, 11/12/2024, 3:15 PM                                  "

## 2024-11-13 ENCOUNTER — PATIENT OUTREACH (OUTPATIENT)
Dept: HEMATOLOGY ONCOLOGY | Facility: CLINIC | Age: 84
End: 2024-11-13

## 2024-11-13 NOTE — PROGRESS NOTES
Called pt to complete the DT, pts daughter Renuka answered and she stated, all her moms needs are taken care of by herself. I gave my contact information if any further assistance is needed

## 2024-11-14 ENCOUNTER — TELEPHONE (OUTPATIENT)
Age: 84
End: 2024-11-14

## 2024-11-14 NOTE — TELEPHONE ENCOUNTER
Patient called in to check with PCP if she needed to have MRI done since she will be having a CT scan on 12/09/24.

## 2024-11-14 NOTE — TELEPHONE ENCOUNTER
Can proceed with CT scan first as ordered by her oncologist but MRI was specifically to follow up on liver lesion seen on previous CT scan. We will see what the recommendation for follow up imaging is after the CT scan, but I would say likely she will still need MRI

## 2024-12-09 ENCOUNTER — HOSPITAL ENCOUNTER (OUTPATIENT)
Dept: CT IMAGING | Facility: HOSPITAL | Age: 84
Discharge: HOME | End: 2024-12-09
Attending: INTERNAL MEDICINE
Payer: MEDICARE

## 2024-12-09 DIAGNOSIS — C18.2 MALIGNANT NEOPLASM OF ASCENDING COLON (HCC): ICD-10-CM

## 2024-12-09 PROCEDURE — 74177 CT ABD & PELVIS W/CONTRAST: CPT

## 2024-12-09 RX ADMIN — IOHEXOL 85 ML: 350 INJECTION, SOLUTION INTRAVENOUS at 15:08

## 2025-01-12 NOTE — PROGRESS NOTES
Name: Delores Crespo      : 1940      MRN: [id number]  Encounter Provider: David Edwards MD  Encounter Date: 2025   Encounter department: [facility] [place]  :  Assessment & Plan  Malignant neoplasm of ascending colon (HCC)  CT abd/Pelvis with no findings of recurrence or metastases in the abdomen or pelvis   CEA wnl.   Patient with no anemia.  Has not noticed any visible blood in the stool however she thinks she has blood in the stool.  Will get stool for occult blood.  As per NCCN guidelines we will continue patient be and blood work every 3 months for 2 years (2025).  Will then transition to every 6 months H&P with labs.    RTC in 3 months with labs or sooner prn.     Orders:    CBC and differential; Standing    Comprehensive metabolic panel; Standing    CEA; Standing    Occult Blood, Fecal Immunochemical; Future    Stage 3 chronic kidney disease, unspecified whether stage 3a or 3b CKD (HCC)  Lab Results   Component Value Date    EGFR 66 2024    EGFR 68 2024    CREATININE 0.82 2024    CREATININE 0.80 2024            Other hyperlipidemia  Continue statins as per PCP.         Primary hypertension  On metoprolol on losartan.  Blood pressure well-controlled.  Continue management as per PCP.         Other specified hypothyroidism  On levothyroxine.   Continue management as per PCP             History of Present Illness   Chief Complaint   Patient presents with    Follow-up   This is a 83 y.o. c PMHx notable for history of stage IIIb colon cancer diagnosed in 2023 status post right hemicolectomy and adjuvant 5-FU treatment,seen in consultation on 2024 for establishing care and surveillance.  She moved from [place] 2-1/2 months ago.  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     Interval History:  Patient presents today for follow-up.  Feels fine.  She had a repeat colonoscopy earlier  "this year which was negative.  She was evaluated by GI Dr. Kenney on 11/04/2024.  Patient states she has not noticed any bright red blood per rectum or dark stools.  States her stools are brown however she still thinks that she has blood in the stool.    Oncology History   Cancer Staging   Malignant neoplasm of ascending colon (HCC)  Staging form: Colon and Rectum, AJCC 8th Edition  - Pathologic stage from 8/4/2023: Stage IIIB (pT3, pN1a, cM0) - Signed by David Edwards MD on 11/12/2024  Stage prefix: Initial diagnosis  Total positive nodes: 1  Oncology History   Malignant neoplasm of ascending colon (HCC)   8/4/2023 -  Cancer Staged    Staging form: Colon and Rectum, AJCC 8th Edition  - Pathologic stage from 8/4/2023: Stage IIIB (pT3, pN1a, cM0) - Signed by David Edwards MD on 11/12/2024  Stage prefix: Initial diagnosis  Total positive nodes: 1       10/8/2024 Initial Diagnosis    Malignant neoplasm of ascending colon (HCC)        Pertinent Medical History   01/13/25: reviewed.     Review of Systems    14 point review of systems was negative except that mentioned in HPI.  Objective   /72 (BP Location: Left arm, Patient Position: Sitting, Cuff Size: Adult)   Pulse 73   Temp (!) 97.3 °F (36.3 °C) (Temporal)   Resp 18   Ht 5' 4\" (1.626 m)   Wt 65.8 kg (145 lb)   SpO2 97%   BMI 24.89 kg/m²     Pain Screening:  Pain Score: 0-No pain  ECOG   0  Physical Exam  Vitals and nursing note reviewed.   Constitutional:       General: She is not in acute distress.     Appearance: Normal appearance.   HENT:      Head: Normocephalic and atraumatic.      Mouth/Throat:      Mouth: Mucous membranes are moist.      Pharynx: Oropharynx is clear.   Eyes:      General: No scleral icterus.     Extraocular Movements: Extraocular movements intact.      Conjunctiva/sclera: Conjunctivae normal.   Cardiovascular:      Rate and Rhythm: Normal rate and regular rhythm.      Pulses: Normal pulses.      Heart sounds: No murmur " heard.  Pulmonary:      Effort: Pulmonary effort is normal.      Breath sounds: Normal breath sounds. No wheezing, rhonchi or rales.   Abdominal:      General: Bowel sounds are normal.      Palpations: Abdomen is soft.      Tenderness: There is no abdominal tenderness.   Musculoskeletal:         General: No swelling or tenderness. Normal range of motion.      Cervical back: Neck supple.   Lymphadenopathy:      Cervical: No cervical adenopathy.   Skin:     General: Skin is warm and dry.      Coloration: Skin is not pale.      Findings: No bruising, erythema or rash.   Neurological:      General: No focal deficit present.      Mental Status: She is alert and oriented to person, place, and time.      Motor: No weakness.   Psychiatric:         Mood and Affect: Mood normal.         Behavior: Behavior normal.         Labs: I have reviewed the following labs:  Lab Results   Component Value Date/Time    WBC 6.68 11/12/2024 04:13 PM    RBC 4.14 11/12/2024 04:13 PM    Hemoglobin 12.6 11/12/2024 04:13 PM    Hematocrit 39.1 11/12/2024 04:13 PM    MCV 94 11/12/2024 04:13 PM    MCH 30.4 11/12/2024 04:13 PM    RDW 13.3 11/12/2024 04:13 PM    Platelets 321 11/12/2024 04:13 PM    Segmented % 52 11/12/2024 04:13 PM    Lymphocytes % 36 11/12/2024 04:13 PM    Monocytes % 9 11/12/2024 04:13 PM    Eosinophils Relative 2 11/12/2024 04:13 PM    Basophils Relative 1 11/12/2024 04:13 PM    Immature Grans % 0 11/12/2024 04:13 PM    Absolute Neutrophils 3.39 11/12/2024 04:13 PM     Lab Results   Component Value Date/Time    Potassium 4.0 11/12/2024 04:13 PM    Chloride 103 11/12/2024 04:13 PM    CO2 30 11/12/2024 04:13 PM    BUN 17 11/12/2024 04:13 PM    Creatinine 0.82 11/12/2024 04:13 PM    Glucose, Fasting 117 (H) 11/05/2024 08:02 AM    Calcium 9.0 11/12/2024 04:13 PM    AST 16 11/12/2024 04:13 PM    ALT 9 11/12/2024 04:13 PM    Alkaline Phosphatase 63 11/12/2024 04:13 PM    Total Protein 6.5 11/12/2024 04:13 PM    Albumin 3.9 11/12/2024  04:13 PM    Total Bilirubin 0.39 11/12/2024 04:13 PM    eGFR 66 11/12/2024 04:13 PM               Disclaimer: This document was prepared using [employer] technology. If a word or phrase is confusing, or does not make sense, this is likely due to recognition error which was not discovered during this clinician's review. If you believe an error has occurred, please contact me through [facility] service line for [email or web address]?cation.      Follow Up    All questions were answered to the patient's satisfaction during this encounter. The patient knows the contact information for our office and knows to reach out for any relevant concerns related to this encounter. They are to call for any temperature 100.4 or higher, new symptoms including but not restricted to shaking chills, decreased appetite, nausea, vomiting, diarrhea, increased fatigue, shortness of breath or chest pain, confusion, and not feeling the strength to come to the clinic. For all other listed problems and medical diagnosis in their chart - they are managed by PCP and/or other specialists, which the patient acknowledges.     I spent 42 minutes reviewing the records (labs, clinician notes, outside records, medical history, ordering medicine/tests/procedures, monitoring of anti-neoplastic toxicities, interpreting the imaging/labs previously done) and coordination of care as well as direct time with the patient today, of which greater than 50% of the time was spent in counseling and coordination of care with the patient/family.

## 2025-01-12 NOTE — ASSESSMENT & PLAN NOTE
CT abd/Pelvis with no findings of recurrence or metastases in the abdomen or pelvis   CEA wnl.   Patient with no anemia.  Has not noticed any visible blood in the stool however she thinks she has blood in the stool.  Will get stool for occult blood.  As per NCCN guidelines we will continue patient be and blood work every 3 months for 2 years (August 2025).  Will then transition to every 6 months H&P with labs.    RTC in 3 months with labs or sooner prn.     Orders:    CBC and differential; Standing    Comprehensive metabolic panel; Standing    CEA; Standing    Occult Blood, Fecal Immunochemical; Future

## 2025-01-13 ENCOUNTER — OFFICE VISIT (OUTPATIENT)
Dept: HEMATOLOGY ONCOLOGY | Facility: CLINIC | Age: 85
End: 2025-01-13
Payer: MEDICARE

## 2025-01-13 VITALS
BODY MASS INDEX: 24.75 KG/M2 | TEMPERATURE: 97.3 F | SYSTOLIC BLOOD PRESSURE: 120 MMHG | DIASTOLIC BLOOD PRESSURE: 72 MMHG | HEART RATE: 73 BPM | RESPIRATION RATE: 18 BRPM | HEIGHT: 64 IN | OXYGEN SATURATION: 97 % | WEIGHT: 145 LBS

## 2025-01-13 DIAGNOSIS — N18.30 STAGE 3 CHRONIC KIDNEY DISEASE, UNSPECIFIED WHETHER STAGE 3A OR 3B CKD (HCC): ICD-10-CM

## 2025-01-13 DIAGNOSIS — E78.49 OTHER HYPERLIPIDEMIA: ICD-10-CM

## 2025-01-13 DIAGNOSIS — I10 PRIMARY HYPERTENSION: ICD-10-CM

## 2025-01-13 DIAGNOSIS — C18.2 MALIGNANT NEOPLASM OF ASCENDING COLON (HCC): Primary | ICD-10-CM

## 2025-01-13 DIAGNOSIS — E03.8 OTHER SPECIFIED HYPOTHYROIDISM: ICD-10-CM

## 2025-01-13 PROCEDURE — 99215 OFFICE O/P EST HI 40 MIN: CPT | Performed by: INTERNAL MEDICINE

## 2025-01-13 NOTE — ASSESSMENT & PLAN NOTE
Lab Results   Component Value Date    EGFR 66 11/12/2024    EGFR 68 11/05/2024    CREATININE 0.82 11/12/2024    CREATININE 0.80 11/05/2024

## 2025-01-13 NOTE — ASSESSMENT & PLAN NOTE
On metoprolol on losartan.  Blood pressure well-controlled.  Continue management as per PCP.

## 2025-01-21 ENCOUNTER — OFFICE VISIT (OUTPATIENT)
Age: 85
End: 2025-01-21
Payer: MEDICARE

## 2025-01-21 ENCOUNTER — APPOINTMENT (OUTPATIENT)
Age: 85
End: 2025-01-21
Payer: MEDICARE

## 2025-01-21 VITALS
OXYGEN SATURATION: 99 % | HEIGHT: 64 IN | HEART RATE: 64 BPM | TEMPERATURE: 97.9 F | RESPIRATION RATE: 18 BRPM | DIASTOLIC BLOOD PRESSURE: 72 MMHG | SYSTOLIC BLOOD PRESSURE: 114 MMHG | BODY MASS INDEX: 24.55 KG/M2 | WEIGHT: 143.8 LBS

## 2025-01-21 DIAGNOSIS — E03.8 OTHER SPECIFIED HYPOTHYROIDISM: ICD-10-CM

## 2025-01-21 DIAGNOSIS — Z00.00 MEDICARE ANNUAL WELLNESS VISIT, SUBSEQUENT: Primary | ICD-10-CM

## 2025-01-21 DIAGNOSIS — D50.0 IRON DEFICIENCY ANEMIA DUE TO CHRONIC BLOOD LOSS: ICD-10-CM

## 2025-01-21 DIAGNOSIS — N18.2 CKD (CHRONIC KIDNEY DISEASE) STAGE 2, GFR 60-89 ML/MIN: ICD-10-CM

## 2025-01-21 DIAGNOSIS — C18.2 MALIGNANT NEOPLASM OF ASCENDING COLON (HCC): ICD-10-CM

## 2025-01-21 DIAGNOSIS — E78.49 OTHER HYPERLIPIDEMIA: ICD-10-CM

## 2025-01-21 DIAGNOSIS — N39.3 FEMALE STRESS INCONTINENCE: ICD-10-CM

## 2025-01-21 DIAGNOSIS — I10 PRIMARY HYPERTENSION: ICD-10-CM

## 2025-01-21 DIAGNOSIS — R73.03 PREDIABETES: ICD-10-CM

## 2025-01-21 LAB
FERRITIN SERPL-MCNC: 38 NG/ML (ref 11–307)
IRON SATN MFR SERPL: 30 % (ref 15–50)
IRON SERPL-MCNC: 108 UG/DL (ref 50–212)
TIBC SERPL-MCNC: 365.4 UG/DL (ref 250–450)
TRANSFERRIN SERPL-MCNC: 261 MG/DL (ref 203–362)
TSH SERPL DL<=0.05 MIU/L-ACNC: 1.01 UIU/ML (ref 0.45–4.5)
UIBC SERPL-MCNC: 257 UG/DL (ref 155–355)

## 2025-01-21 PROCEDURE — 83540 ASSAY OF IRON: CPT

## 2025-01-21 PROCEDURE — 82728 ASSAY OF FERRITIN: CPT

## 2025-01-21 PROCEDURE — G0438 PPPS, INITIAL VISIT: HCPCS | Performed by: STUDENT IN AN ORGANIZED HEALTH CARE EDUCATION/TRAINING PROGRAM

## 2025-01-21 PROCEDURE — 84443 ASSAY THYROID STIM HORMONE: CPT

## 2025-01-21 PROCEDURE — 83550 IRON BINDING TEST: CPT

## 2025-01-21 PROCEDURE — 36415 COLL VENOUS BLD VENIPUNCTURE: CPT

## 2025-01-21 RX ORDER — FERROUS SULFATE 325(65) MG
TABLET ORAL
COMMUNITY
Start: 2025-01-20

## 2025-01-21 NOTE — PROGRESS NOTES
Name: Delores Crespo      : 1940      MRN: [id number]  Encounter Provider: Salo Calvillo MD  Encounter Date: 2025   Encounter department: [facility] PRIMARY CARE [facility]  Medicare annual wellness visit, subsequent  Immunizations: Recommended RSV vaccine; otherwise UTD  Labs: TSH, iron panel  Screening: Repeat colonoscopy planned for  per GI       Other specified hypothyroidism  TSH 2 months ago 6.182 and levothyroxine dose was increased to 88 mcg at that time. She has not yet gotten repeat TSH.  Check TSH to see if levothyroxine dose needs adjustment  Orders:    TSH, 3rd generation with Free T4 reflex; Future    Malignant neoplasm of ascending colon (HCC)  S/p right hemicolectomy. Has now established with Oncology and GI in the area. She is pending FOBT ordered by her Oncologist due to patient's concerns.       CKD (chronic kidney disease) stage 2, GFR 60-89 ml/min  Lab Results   Component Value Date    EGFR 66 2024    EGFR 68 2024    CREATININE 0.82 2024    CREATININE 0.80 2024     Will continue good BP management and avoid nephrotoxins.       Primary hypertension  BP well controlled. Continue Toprol XL 25 mg qd and losartan 25 mg qd.       Female stress incontinence  Patient endorses this every once in a while but not too bothersome. Counseled on kegel exercises and limiting fluid intake 3-4 hours prior to bed. She denies referral to PT/Urogynecology.       Iron deficiency anemia due to chronic blood loss  In relation to her colon cancer and hemicolectomy. Patient on iron supplements.  Check iron panel     Orders:    Iron Panel (Includes Ferritin, Iron Sat%, Iron, and TIBC); Future    Other hyperlipidemia  Lipids well controlled on most recent blodo work. Continue simvastatin 80 mg qhs.       Prediabetes  A1c 6.0 on most recent labs. Patient on metformin 500 mg qd - can continue. Will recheck A1c at next office visit.         Falls Plan of  Care: balance, strength, and gait training instructions were provided.     Urinary Incontinence Plan of Care: counseling topics discussed: practice Kegel (pelvic floor strengthening) exercises and limiting fluid intake 3-4 hours before bed.       Preventive health issues were discussed with patient, and age appropriate screening tests were ordered as noted in patient's After Visit Summary. Personalized health advice and appropriate referrals for health education or preventive services given if needed, as noted in patient's After Visit Summary.    History of Present Illness     Delores Crespo is an 83 yo F with PMH of colon cancer s/p right hemicolectomy, HTN, HLD, ROJELIO, thyroid nodule, lung nodule, prediabetes and hypothyroidism who presents today for AWV. No complaints. She will be getting hearing aids in 2 months.       Patient Care Team:  Salo Calvillo MD as PCP - General (Internal Medicine)  Malcolm Edwards MD (Oncology)  Christian Kenney MD (GI)    Review of Systems   Constitutional:  Negative for appetite change, chills and fever.   HENT:  Negative for congestion and sore throat.    Eyes:  Negative for visual disturbance.   Respiratory:  Negative for cough and shortness of breath.    Cardiovascular:  Negative for chest pain and leg swelling.   Gastrointestinal:  Negative for abdominal pain, constipation, diarrhea and nausea.   Genitourinary:  Negative for difficulty urinating and dysuria.   Musculoskeletal:  Negative for arthralgias and myalgias.   Skin:  Negative for rash.   Neurological:  Negative for dizziness, light-headedness and headaches.   Psychiatric/Behavioral:  Negative for confusion.      Medical History Reviewed by provider this encounter:       Annual Wellness Visit Questionnaire   Delores is here for her Subsequent Wellness visit.     Health Risk Assessment:   Patient rates overall health as good. Patient feels that their physical health rating is same. Patient is satisfied with their life.  Eyesight was rated as same. Hearing was rated as slightly worse. Patient feels that their emotional and mental health rating is same. Patients states they are never, rarely angry. Patient states they are never, rarely unusually tired/fatigued. Pain experienced in the last 7 days has been none. Patient states that she has experienced no weight loss or gain in last 6 months.     Fall Risk Screening:   In the past year, patient has experienced: no history of falling in past year      Urinary Incontinence Screening:   Patient has leaked urine accidently in the last six months.     Home Safety:  Patient does not have trouble with stairs inside or outside of their home. Patient has working smoke alarms and has working carbon monoxide detector. Home safety hazards include: none.     Nutrition:   Current diet is Regular.     Medications:   Patient is currently taking over-the-counter supplements. OTC medications include: see medication list. Patient is able to manage medications.     Activities of Daily Living (ADLs)/Instrumental Activities of Daily Living (IADLs):   Walk and transfer into and out of bed and chair?: Yes  Dress and groom yourself?: Yes    Bathe or shower yourself?: Yes    Feed yourself? Yes  Do your laundry/housekeeping?: Yes  Manage your money, pay your bills and track your expenses?: Yes  Make your own meals?: Yes    Do your own shopping?: Yes    Previous Hospitalizations:   Any hospitalizations or ED visits within the last 12 months?: No      Advance Care Planning:   Living will: Yes    Advanced directive: Yes    Advanced directive counseling given: Yes      PREVENTIVE SCREENINGS      Cardiovascular Screening:    General: Screening Not Indicated and History Lipid Disorder      Diabetes Screening:     General: Screening Current      Colorectal Cancer Screening:     General: History Colorectal Cancer      Cervical Cancer Screening:    General: Screening Not Indicated      Lung Cancer Screening:     General:  "Screening Not Indicated    Screening, Brief Intervention, and Referral to Treatment (SBIRT)    Screening      Single Item Drug Screening:  How often have you used an illegal drug (including marijuana) or a prescription medication for non-medical reasons in the past year? never    Single Item Drug Screen Score: 0  Interpretation: Negative screen for possible drug use disorder       No results found.    Objective   /72 (BP Location: Right arm, Patient Position: Sitting, Cuff Size: Standard)   Pulse 64   Temp 97.9 °F (36.6 °C) (Tympanic)   Resp 18   Ht 5' 4\" (1.626 m)   Wt 65.2 kg (143 lb 12.8 oz)   SpO2 99%   BMI 24.68 kg/m²     Physical Exam  Constitutional:       General: She is not in acute distress.  HENT:      Right Ear: Tympanic membrane, ear canal and external ear normal.      Left Ear: Ear canal and external ear normal. There is impacted cerumen.      Nose: Nose normal.      Mouth/Throat:      Mouth: Mucous membranes are moist.      Pharynx: Oropharynx is clear.   Eyes:      Conjunctiva/sclera: Conjunctivae normal.      Pupils: Pupils are equal, round, and reactive to light.   Neck:      Thyroid: No thyroid mass or thyroid tenderness.   Cardiovascular:      Rate and Rhythm: Normal rate and regular rhythm.      Heart sounds: Normal heart sounds.   Pulmonary:      Effort: Pulmonary effort is normal.      Breath sounds: Normal breath sounds.   Abdominal:      General: There is no distension.      Tenderness: There is no abdominal tenderness.   Musculoskeletal:      Cervical back: Neck supple.      Right lower leg: No edema.      Left lower leg: No edema.   Skin:     General: Skin is warm and dry.   Neurological:      Mental Status: She is alert.      Sensory: Sensation is intact.      Motor: Motor function is intact.   Psychiatric:         Mood and Affect: Mood normal.         Speech: Speech normal.         Behavior: Behavior normal. Behavior is cooperative.         "

## 2025-01-21 NOTE — PATIENT INSTRUCTIONS
Medicare Preventive Visit Patient Instructions  Thank you for completing your Welcome to Medicare Visit or Medicare Annual Wellness Visit today. Your next wellness visit will be due in one year (1/22/2026).  The screening/preventive services that you may require over the next 5-10 years are detailed below. Some tests may not apply to you based off risk factors and/or age. Screening tests ordered at today's visit but not completed yet may show as past due. Also, please note that scanned in results may not display below.  Preventive Screenings:  Service Recommendations Previous Testing/Comments   Colorectal Cancer Screening  * Colonoscopy    * Fecal Occult Blood Test (FOBT)/Fecal Immunochemical Test (FIT)  * Fecal DNA/Cologuard Test  * Flexible Sigmoidoscopy Age: 45-75 years old   Colonoscopy: every 10 years (may be performed more frequently if at higher risk)  OR  FOBT/FIT: every 1 year  OR  Cologuard: every 3 years  OR  Sigmoidoscopy: every 5 years  Screening may be recommended earlier than age 45 if at higher risk for colorectal cancer. Also, an individualized decision between you and your healthcare provider will decide whether screening between the ages of 76-85 would be appropriate. Colonoscopy: Not on file  FOBT/FIT: Not on file  Cologuard: Not on file  Sigmoidoscopy: Not on file    History Colorectal Cancer     Breast Cancer Screening Age: 40+ years old  Frequency: every 1-2 years  Not required if history of left and right mastectomy Mammogram: Not on file        Cervical Cancer Screening Between the ages of 21-29, pap smear recommended once every 3 years.   Between the ages of 30-65, can perform pap smear with HPV co-testing every 5 years.   Recommendations may differ for women with a history of total hysterectomy, cervical cancer, or abnormal pap smears in past. Pap Smear: Not on file    Screening Not Indicated   Hepatitis C Screening Once for adults born between 1945 and 1965  More frequently in patients at  high risk for Hepatitis C Hep C Antibody: Not on file        Diabetes Screening 1-2 times per year if you're at risk for diabetes or have pre-diabetes Fasting glucose: 117 mg/dL (11/5/2024)  A1C: 6.0 % (11/5/2024)  Screening Current   Cholesterol Screening Once every 5 years if you don't have a lipid disorder. May order more often based on risk factors. Lipid panel: 11/05/2024    Screening Not Indicated  History Lipid Disorder     Other Preventive Screenings Covered by Medicare:  Abdominal Aortic Aneurysm (AAA) Screening: covered once if your at risk. You're considered to be at risk if you have a family history of AAA.  Lung Cancer Screening: covers low dose CT scan once per year if you meet all of the following conditions: (1) Age 55-77; (2) No signs or symptoms of lung cancer; (3) Current smoker or have quit smoking within the last 15 years; (4) You have a tobacco smoking history of at least 20 pack years (packs per day multiplied by number of years you smoked); (5) You get a written order from a healthcare provider.  Glaucoma Screening: covered annually if you're considered high risk: (1) You have diabetes OR (2) Family history of glaucoma OR (3)  aged 50 and older OR (4)  American aged 65 and older  Osteoporosis Screening: covered every 2 years if you meet one of the following conditions: (1) You're estrogen deficient and at risk for osteoporosis based off medical history and other findings; (2) Have a vertebral abnormality; (3) On glucocorticoid therapy for more than 3 months; (4) Have primary hyperparathyroidism; (5) On osteoporosis medications and need to assess response to drug therapy.   Last bone density test (DXA Scan): 11/05/2024.  HIV Screening: covered annually if you're between the age of 15-65. Also covered annually if you are younger than 15 and older than 65 with risk factors for HIV infection. For pregnant patients, it is covered up to 3 times per  pregnancy.    Immunizations:  Immunization Recommendations   Influenza Vaccine Annual influenza vaccination during flu season is recommended for all persons aged >= 6 months who do not have contraindications   Pneumococcal Vaccine   * Pneumococcal conjugate vaccine = PCV13 (Prevnar 13), PCV15 (Vaxneuvance), PCV20 (Prevnar 20)  * Pneumococcal polysaccharide vaccine = PPSV23 (Pneumovax) Adults 19-65 yo with certain risk factors or if 65+ yo  If never received any pneumonia vaccine: recommend Prevnar 20 (PCV20)  Give PCV20 if previously received 1 dose of PCV13 or PPSV23   Hepatitis B Vaccine 3 dose series if at intermediate or high risk (ex: diabetes, end stage renal disease, liver disease)   Respiratory syncytial virus (RSV) Vaccine - COVERED BY MEDICARE PART D  * RSVPreF3 (Arexvy) CDC recommends that adults 60 years of age and older may receive a single dose of RSV vaccine using shared clinical decision-making (SCDM)   Tetanus (Td) Vaccine - COST NOT COVERED BY MEDICARE PART B Following completion of primary series, a booster dose should be given every 10 years to maintain immunity against tetanus. Td may also be given as tetanus wound prophylaxis.   Tdap Vaccine - COST NOT COVERED BY MEDICARE PART B Recommended at least once for all adults. For pregnant patients, recommended with each pregnancy.   Shingles Vaccine (Shingrix) - COST NOT COVERED BY MEDICARE PART B  2 shot series recommended in those 19 years and older who have or will have weakened immune systems or those 50 years and older     Health Maintenance Due:  There are no preventive care reminders to display for this patient.  Immunizations Due:      Topic Date Due   • Pneumococcal Vaccine: 65+ Years (2 of 2 - PPSV23 or PCV20) 11/17/2016   • COVID-19 Vaccine (4 - 2024-25 season) 09/01/2024     Advance Directives   What are advance directives?  Advance directives are legal documents that state your wishes and plans for medical care. These plans are made  ahead of time in case you lose your ability to make decisions for yourself. Advance directives can apply to any medical decision, such as the treatments you want, and if you want to donate organs.   What are the types of advance directives?  There are many types of advance directives, and each state has rules about how to use them. You may choose a combination of any of the following:  Living will:  This is a written record of the treatment you want. You can also choose which treatments you do not want, which to limit, and which to stop at a certain time. This includes surgery, medicine, IV fluid, and tube feedings.   Durable power of  for healthcare (DPAHC):  This is a written record that states who you want to make healthcare choices for you when you are unable to make them for yourself. This person, called a proxy, is usually a family member or a friend. You may choose more than 1 proxy.  Do not resuscitate (DNR) order:  A DNR order is used in case your heart stops beating or you stop breathing. It is a request not to have certain forms of treatment, such as CPR. A DNR order may be included in other types of advance directives.  Medical directive:  This covers the care that you want if you are in a coma, near death, or unable to make decisions for yourself. You can list the treatments you want for each condition. Treatment may include pain medicine, surgery, blood transfusions, dialysis, IV or tube feedings, and a ventilator (breathing machine).  Values history:  This document has questions about your views, beliefs, and how you feel and think about life. This information can help others choose the care that you would choose.  Why are advance directives important?  An advance directive helps you control your care. Although spoken wishes may be used, it is better to have your wishes written down. Spoken wishes can be misunderstood, or not followed. Treatments may be given even if you do not want them. An  advance directive may make it easier for your family to make difficult choices about your care.   Urinary Incontinence   Urinary incontinence (UI)  is when you lose control of your bladder. UI develops because your bladder cannot store or empty urine properly. The 3 most common types of UI are stress incontinence, urge incontinence, or both.  Medicines:   May be given to help strengthen your bladder control. Report any side effects of medication to your healthcare provider.  Do pelvic muscle exercises often:  Your pelvic muscles help you stop urinating. Squeeze these muscles tight for 5 seconds, then relax for 5 seconds. Gradually work up to squeezing for 10 seconds. Do 3 sets of 15 repetitions a day, or as directed. This will help strengthen your pelvic muscles and improve bladder control.  Train your bladder:  Go to the bathroom at set times, such as every 2 hours, even if you do not feel the urge to go. You can also try to hold your urine when you feel the urge to go. For example, hold your urine for 5 minutes when you feel the urge to go. As that becomes easier, hold your urine for 10 minutes.   Self-care:   Keep a UI record.  Write down how often you leak urine and how much you leak. Make a note of what you were doing when you leaked urine.  Drink liquids as directed. You may need to limit the amount of liquid you drink to help control your urine leakage. Do not drink any liquid right before you go to bed. Limit or do not have drinks that contain caffeine or alcohol.   Prevent constipation.  Eat a variety of high-fiber foods. Good examples are high-fiber cereals, beans, vegetables, and whole-grain breads. Walking is the best way to trigger your intestines to have a bowel movement.  Exercise regularly and maintain a healthy weight.  Weight loss and exercise will decrease pressure on your bladder and help you control your leakage.   Use a catheter as directed  to help empty your bladder. A catheter is a tiny,  "plastic tube that is put into your bladder to drain your urine.   Go to behavior therapy as directed.  Behavior therapy may be used to help you learn to control your urge to urinate.     © Copyright [employer] 2018 Information is for End User's use only and may not be sold, redistributed or otherwise used for commercial purposes. All illustrations and images included in CareNotes® are the copyrighted property of [employer] or [employer]      Patient Education     Urinary incontinence in females   The Basics   Written by the doctors and editors at [facility]   What is urinary incontinence? -- Urinary incontinence is the medical term for when a person leaks urine or loses bladder control.  Incontinence is a very common problem, but it is not a normal part of aging. If you have this problem, there are treatments that can help. There are also things that you can do on your own to stop or reduce urine leakage so you don't have to \"just live with it.\"  What are the symptoms of incontinence? -- There are different types of incontinence. Each causes different symptoms. The 3 most common types are:   Stress incontinence - With stress incontinence, you leak urine when you laugh, cough, sneeze, or do anything that \"stresses\" the belly. Stress incontinence is most common in females, especially those who have had a baby.   Urgency incontinence - With urgency incontinence, you feel a strong need to urinate all of a sudden. This is also known as \"urge incontinence.\" Often, the \"urge\" is so strong that you can't make it to the bathroom in time. \"Overactive bladder\" is another term for having a sudden, frequent urge to urinate. People with overactive bladder might or might not actually leak urine.   Mixed incontinence - With mixed incontinence, you have symptoms of both stress and urgency incontinence.  Is there anything I can do on my own to feel better? -- Yes. Here are some things that can help reduce urine " "leaks:   Reduce the amount of liquid that you drink, especially a few hours before bed.   Cut down on any foods or drinks that make your symptoms worse. Some people find that alcohol, caffeine, or spicy or acidic foods irritate the bladder.   Try to lose weight, if you are overweight. Your doctor or nurse can help you do this in a healthy way.   If you have diabetes, keep your blood sugar as close to your goal level as possible.   If you take medicines called diuretics, plan ahead. These medicines increase the need to urinate. Try to take them when you know you will be near a bathroom for a few hours. If you keep having problems with leakage because of diuretics, ask your doctor if you can take a lower dose or switch to a different medicine.  These techniques can also help improve bladder control:   Bladder retraining - During bladder retraining, you go to the bathroom at scheduled times. For instance, you might decide that you will go every hour. Make yourself go every hour, even if you don't feel like you need to. Try to wait the whole hour, even if you need to go sooner. Then, once you get used to going every hour, increase the amount of time you wait in between bathroom visits. Over time, you might be able to \"retrain\" your bladder to wait 3 or 4 hours between bathroom visits.   Pelvic floor muscle training - This involves learning exercises to strengthen and relax your pelvic muscles. These include the muscles that control the flow of urine and bowel movements. When done right, these exercises can help. But people often do them wrong. Ask your doctor or nurse how to do them right. Your doctor might suggest working with a physical therapist who has special training in these exercises.  Should I see my doctor or nurse? -- Yes. Your doctor or nurse can find out what might be causing your incontinence. They can also suggest ways to relieve the problem.  When you speak to your doctor or nurse, ask if any of the " medicines you take could be causing your symptoms. Some medicines can cause incontinence or make it worse.  Some people choose to wear pads or special underwear. These can help if you accidentally leak urine once in a while. But they can also cause skin irritation if you use them a lot. If you have incontinence, ask your doctor or nurse how to treat it.  How is incontinence treated? -- The treatment options differ depending on what type of incontinence you have. Some of the options include:   Medicines to relax the bladder   Surgery to repair the tissues that support the bladder or to improve the flow of urine   Electrical stimulation of the nerves that relax the bladder  Urinary incontinence is more common in people who have been through menopause. (Menopause is when you stop having monthly periods). Some people have vaginal dryness after menopause. If this is the case for you, a treatment called vaginal estrogen might help.  What will my life be like? -- Many people with incontinence can regain bladder control or at least reduce the amount of leakage they have. The most important thing is to tell your doctor or nurse. Then, work with them to find an approach that helps you.  All topics are updated as new evidence becomes available and our peer review process is complete.  This topic retrieved from [employer] on: Feb 26, 2024.  Topic 74645 Version 18.0  Release: 32.2.4 - C32.56  © 2024 UpToDate, Inc. and/or its affiliates. All rights reserved.  figure 1: Location of the bladder     This drawing shows the side view of a woman's body. The bladder is in front of the vagina. The urethra is the tube that carries urine from the bladder out of the body.  Graphic 218987 Version 1.0  Consumer Information Use and Disclaimer   Disclaimer: This generalized information is a limited summary of diagnosis, treatment, and/or medication information. It is not meant to be comprehensive and should be used as a tool to help the user  "understand and/or assess potential diagnostic and treatment options. It does NOT include all information about conditions, treatments, medications, side effects, or risks that may apply to a specific patient. It is not intended to be medical advice or a substitute for the medical advice, diagnosis, or treatment of a health care provider based on the health care provider's examination and assessment of a patient's specific and unique circumstances. Patients must speak with a health care provider for complete information about their health, medical questions, and treatment options, including any risks or benefits regarding use of medications. This information does not endorse any treatments or medications as safe, effective, or approved for treating a specific patient. UpToDate, Inc. and its affiliates disclaim any warranty or liability relating to this information or the use thereof.The use of this information is governed by the Terms of Use, available at https://www.[employer]/en/know/clinical-effectiveness-terms. 2024© UpToDate, Inc. and its affiliates and/or licensors. All rights reserved.  Copyright   © 2024 UpToDate, Inc. and/or its affiliates. All rights reserved.    Patient Education     Pelvic floor muscle exercises   The Basics   Written by the doctors and editors at [facility]   What is the pelvic floor? -- The \"pelvic floor\" is the name for the muscles that support the organs in the pelvis. These organs include the bladder and rectum. In the female pelvis, they also include the uterus (figure 1).  What are pelvic floor muscle exercises? -- These are exercises that can make your pelvic floor muscles stronger. They involve learning ways to tighten and relax specific muscles.  Pelvic floor muscle exercises can help keep you from leaking urine, gas, or bowel movements. They can also help with a condition called \"pelvic organ prolapse.\" This is when the organs in the lower belly drop down and press against " "or bulge into the vagina.  One way to strengthen your pelvic floor muscles is to do exercises. These are also known as \"Kegel\" exercises.  How do I do pelvic floor muscle exercises? -- If you want to try pelvic floor muscle exercises, start by talking to your doctor or nurse. They can talk to you about whether these exercises can help you. They can also teach you how to do them correctly.  You will need to learn which muscles to tighten and relax. It is sometimes hard to figure out the right muscles.  Some ways you can practice:   People with female or male anatomy - Squeeze the muscles you would use to avoid passing gas.   People with female anatomy - Put a finger inside your vagina and squeeze the muscles around your finger. Or you can imagine that you are sitting on a marble and have to pick it up using your vagina.   People with male anatomy - Squeeze the muscles that control the flow of urine. These exercises might help reduce urine leaks in people who have had surgery to treat prostate cancer or an enlarged prostate.  No matter how you learn to do pelvic floor muscle exercises, know that the muscles involved are not in your belly, thighs, or buttocks.  After you learn which muscles to tighten and relax, you can do the exercises in any position (standing, sitting, or lying down).  Should I see a physical therapist? -- Your doctor or nurse might suggest working with a physical therapist who has special training in pelvic floor issues. They can check your muscle strength and teach you specific exercises.  How often should I do the exercises? -- A common approach is to try to do a set of the exercises 3 times a day.  For each set, do the following about 10 times:   Squeeze your pelvic muscles.   Hold the muscles tight for about 10 seconds.   Relax the muscles completely.  Keep up this routine for at least a few months. You will probably notice results, but it might take a few weeks to several months.  How do " "pelvic floor muscle exercises help? -- Pelvic floor muscle exercises can help:   Prevent urine leaks in people who have \"stress incontinence\" - This means that they leak urine when they cough, laugh, sneeze, or strain.   Control sudden urges to urinate - These happen to people with \"urinary urgency\" or \"urge incontinence.\"   Control the release of gas or bowel movements   Improve symptoms caused by pelvic organ prolapse - These can include pressure or bulging in the vagina. If you have these symptoms, see your doctor or nurse to find out the cause.  It might take a few months of doing the exercises regularly before you notice them working. If you have been doing pelvic floor muscle exercises for several months and they don't seem to be making a difference, tell your doctor or nurse. They might suggest seeing a physical therapist or trying other treatments for your condition.  All topics are updated as new evidence becomes available and our peer review process is complete.  This topic retrieved from [employer] on: Feb 26, 2024.  Topic 50009 Version 15.0  Release: 32.2.4 - C32.56  © 2024 UpToDate, Inc. and/or its affiliates. All rights reserved.  figure 1: Pelvic floor muscles     The \"pelvic floor\" is a group of muscles that support the organs in the pelvis. In females, these organs include the uterus, bladder, and rectum.  Graphic 209078 Version 2.0  Consumer Information Use and Disclaimer   Disclaimer: This generalized information is a limited summary of diagnosis, treatment, and/or medication information. It is not meant to be comprehensive and should be used as a tool to help the user understand and/or assess potential diagnostic and treatment options. It does NOT include all information about conditions, treatments, medications, side effects, or risks that may apply to a specific patient. It is not intended to be medical advice or a substitute for the medical advice, diagnosis, or treatment of a health care " provider based on the health care provider's examination and assessment of a patient's specific and unique circumstances. Patients must speak with a health care provider for complete information about their health, medical questions, and treatment options, including any risks or benefits regarding use of medications. This information does not endorse any treatments or medications as safe, effective, or approved for treating a specific patient. UpToDate, Inc. and its affiliates disclaim any warranty or liability relating to this information or the use thereof.The use of this information is governed by the Terms of Use, available at https://www.[employer]/en/know/clinical-effectiveness-terms. 2024© UpToDate, Inc. and its affiliates and/or licensors. All rights reserved.  Copyright   © 2024 UpToDate, Inc. and/or its affiliates. All rights reserved.

## 2025-01-21 NOTE — ASSESSMENT & PLAN NOTE
A1c 6.0 on most recent labs. Patient on metformin 500 mg qd - can continue. Will recheck A1c at next office visit.       
BP well controlled. Continue Toprol XL 25 mg qd and losartan 25 mg qd.       
In relation to her colon cancer and hemicolectomy. Patient on iron supplements.  Check iron panel     Orders:    Iron Panel (Includes Ferritin, Iron Sat%, Iron, and TIBC); Future    
Lab Results   Component Value Date    EGFR 66 11/12/2024    EGFR 68 11/05/2024    CREATININE 0.82 11/12/2024    CREATININE 0.80 11/05/2024     Will continue good BP management and avoid nephrotoxins.       
Lipids well controlled on most recent blodo work. Continue simvastatin 80 mg qhs.       
S/p right hemicolectomy. Has now established with Oncology and GI in the area. She is pending FOBT ordered by her Oncologist due to patient's concerns.       
TSH 2 months ago 6.182 and levothyroxine dose was increased to 88 mcg at that time. She has not yet gotten repeat TSH.  Check TSH to see if levothyroxine dose needs adjustment  Orders:    TSH, 3rd generation with Free T4 reflex; Future    
Principal Discharge DX:	Sinusitis, acute

## 2025-01-22 ENCOUNTER — RESULTS FOLLOW-UP (OUTPATIENT)
Age: 85
End: 2025-01-22

## 2025-01-22 NOTE — TELEPHONE ENCOUNTER
----- Message from Salo Calvillo MD sent at 1/21/2025  9:38 PM EST -----  Please let patient know that her thyroid level is normal. Continue her current dose for now. Her iron levels are normal, but I think it is a good idea to keep taking her iron supplement given her history of hemicolectomy.

## 2025-01-23 NOTE — TELEPHONE ENCOUNTER
She is asking about the B12 blood work... she said you asked her why she was taking it. She wants to know if she should continue taking it or stop it? Thank you

## 2025-01-23 NOTE — TELEPHONE ENCOUNTER
----- Message from Salo Calvillo MD sent at 1/23/2025 10:22 AM EST -----  We did not do B12 blood work as there was no diagnosis for which Medicare would cover. She can continue taking the B12

## 2025-04-08 DIAGNOSIS — C18.2 MALIGNANT NEOPLASM OF ASCENDING COLON (HCC): Primary | ICD-10-CM

## 2025-04-09 ENCOUNTER — APPOINTMENT (OUTPATIENT)
Dept: LAB | Facility: HOSPITAL | Age: 85
End: 2025-04-09
Payer: MEDICARE

## 2025-04-09 ENCOUNTER — APPOINTMENT (OUTPATIENT)
Dept: LAB | Facility: HOSPITAL | Age: 85
End: 2025-04-09
Attending: INTERNAL MEDICINE
Payer: MEDICARE

## 2025-04-09 DIAGNOSIS — C18.2 MALIGNANT NEOPLASM OF ASCENDING COLON (HCC): ICD-10-CM

## 2025-04-09 LAB
ALBUMIN SERPL BCG-MCNC: 3.9 G/DL (ref 3.5–5)
ALP SERPL-CCNC: 58 U/L (ref 34–104)
ALT SERPL W P-5'-P-CCNC: 11 U/L (ref 7–52)
ANION GAP SERPL CALCULATED.3IONS-SCNC: 5 MMOL/L (ref 4–13)
AST SERPL W P-5'-P-CCNC: 18 U/L (ref 13–39)
BASOPHILS # BLD AUTO: 0.07 THOUSANDS/ÂΜL (ref 0–0.1)
BASOPHILS NFR BLD AUTO: 1 % (ref 0–1)
BILIRUB SERPL-MCNC: 0.64 MG/DL (ref 0.2–1)
BUN SERPL-MCNC: 17 MG/DL (ref 5–25)
CALCIUM SERPL-MCNC: 9 MG/DL (ref 8.4–10.2)
CEA SERPL-MCNC: 1.2 NG/ML (ref 0–3)
CHLORIDE SERPL-SCNC: 103 MMOL/L (ref 96–108)
CO2 SERPL-SCNC: 28 MMOL/L (ref 21–32)
CREAT SERPL-MCNC: 0.97 MG/DL (ref 0.6–1.3)
EOSINOPHIL # BLD AUTO: 0.13 THOUSAND/ÂΜL (ref 0–0.61)
EOSINOPHIL NFR BLD AUTO: 2 % (ref 0–6)
ERYTHROCYTE [DISTWIDTH] IN BLOOD BY AUTOMATED COUNT: 13.2 % (ref 11.6–15.1)
GFR SERPL CREATININE-BSD FRML MDRD: 53 ML/MIN/1.73SQ M
GLUCOSE P FAST SERPL-MCNC: 111 MG/DL (ref 65–99)
HCT VFR BLD AUTO: 40.4 % (ref 34.8–46.1)
HGB BLD-MCNC: 13.1 G/DL (ref 11.5–15.4)
IMM GRANULOCYTES # BLD AUTO: 0.03 THOUSAND/UL (ref 0–0.2)
IMM GRANULOCYTES NFR BLD AUTO: 0 % (ref 0–2)
LYMPHOCYTES # BLD AUTO: 2.1 THOUSANDS/ÂΜL (ref 0.6–4.47)
LYMPHOCYTES NFR BLD AUTO: 28 % (ref 14–44)
MCH RBC QN AUTO: 30.3 PG (ref 26.8–34.3)
MCHC RBC AUTO-ENTMCNC: 32.4 G/DL (ref 31.4–37.4)
MCV RBC AUTO: 93 FL (ref 82–98)
MONOCYTES # BLD AUTO: 0.62 THOUSAND/ÂΜL (ref 0.17–1.22)
MONOCYTES NFR BLD AUTO: 8 % (ref 4–12)
NEUTROPHILS # BLD AUTO: 4.63 THOUSANDS/ÂΜL (ref 1.85–7.62)
NEUTS SEG NFR BLD AUTO: 61 % (ref 43–75)
NRBC BLD AUTO-RTO: 0 /100 WBCS
PLATELET # BLD AUTO: 300 THOUSANDS/UL (ref 149–390)
PMV BLD AUTO: 9.7 FL (ref 8.9–12.7)
POTASSIUM SERPL-SCNC: 4 MMOL/L (ref 3.5–5.3)
PROT SERPL-MCNC: 6.5 G/DL (ref 6.4–8.4)
RBC # BLD AUTO: 4.33 MILLION/UL (ref 3.81–5.12)
SODIUM SERPL-SCNC: 136 MMOL/L (ref 135–147)
WBC # BLD AUTO: 7.58 THOUSAND/UL (ref 4.31–10.16)

## 2025-04-09 PROCEDURE — 82378 CARCINOEMBRYONIC ANTIGEN: CPT

## 2025-04-09 PROCEDURE — 80053 COMPREHEN METABOLIC PANEL: CPT

## 2025-04-09 PROCEDURE — 85025 COMPLETE CBC W/AUTO DIFF WBC: CPT

## 2025-04-09 PROCEDURE — 36415 COLL VENOUS BLD VENIPUNCTURE: CPT

## 2025-04-10 ENCOUNTER — APPOINTMENT (OUTPATIENT)
Dept: LAB | Facility: HOSPITAL | Age: 85
End: 2025-04-10
Payer: MEDICARE

## 2025-04-10 DIAGNOSIS — C18.2 MALIGNANT NEOPLASM OF ASCENDING COLON (HCC): ICD-10-CM

## 2025-04-10 LAB — HEMOCCULT STL QL IA: POSITIVE

## 2025-04-10 PROCEDURE — G0328 FECAL BLOOD SCRN IMMUNOASSAY: HCPCS

## 2025-04-15 ENCOUNTER — OFFICE VISIT (OUTPATIENT)
Dept: HEMATOLOGY ONCOLOGY | Facility: CLINIC | Age: 85
End: 2025-04-15
Payer: MEDICARE

## 2025-04-15 VITALS
TEMPERATURE: 97.5 F | HEIGHT: 64 IN | DIASTOLIC BLOOD PRESSURE: 62 MMHG | SYSTOLIC BLOOD PRESSURE: 136 MMHG | HEART RATE: 69 BPM | WEIGHT: 151 LBS | OXYGEN SATURATION: 96 % | BODY MASS INDEX: 25.78 KG/M2 | RESPIRATION RATE: 17 BRPM

## 2025-04-15 DIAGNOSIS — I10 PRIMARY HYPERTENSION: ICD-10-CM

## 2025-04-15 DIAGNOSIS — C18.2 MALIGNANT NEOPLASM OF ASCENDING COLON (HCC): Primary | ICD-10-CM

## 2025-04-15 DIAGNOSIS — Z12.31 ENCOUNTER FOR SCREENING MAMMOGRAM FOR MALIGNANT NEOPLASM OF BREAST: ICD-10-CM

## 2025-04-15 DIAGNOSIS — Z90.49 H/O RIGHT HEMICOLECTOMY: ICD-10-CM

## 2025-04-15 DIAGNOSIS — E78.49 OTHER HYPERLIPIDEMIA: ICD-10-CM

## 2025-04-15 DIAGNOSIS — E03.8 OTHER SPECIFIED HYPOTHYROIDISM: ICD-10-CM

## 2025-04-15 PROCEDURE — G2211 COMPLEX E/M VISIT ADD ON: HCPCS | Performed by: INTERNAL MEDICINE

## 2025-04-15 PROCEDURE — 99215 OFFICE O/P EST HI 40 MIN: CPT | Performed by: INTERNAL MEDICINE

## 2025-04-15 NOTE — PROGRESS NOTES
Name: Delores Crespo      : 1940      MRN: [id number]  Encounter Provider: David Edwards MD  Encounter Date: 4/15/2025   Encounter department: [facility] [place]  :  Assessment & Plan  Malignant neoplasm of ascending colon (HCC)  CT abd/Pelvis in 2024 with no findings of recurrence or metastases in the abdomen or pelvis   CEA wnl.   Patient with no anemia.  Stool for occult blood positive.   Reached out to Dr. Kenney (Gastroenterologist) via Epic Chat. Awaiting response.       We discussed surveillance per NCCN guidelines as follows:   History and physical examination every 3-6 mo for 2 y, then every 6 mo for a total of 5 y    CEA every 3-6 mo for 2 y, then every 6 mo for a total of 5 y    Chest/abdominal/pelvic CT every 6-12 mo (category 2B for frequency <12 mo) from date of surgery for a total of 5 y    Colonoscopy in 1 y after surgery except if no preoperative colonoscopy due to obstructing lesion, colonoscopy in 3-6 mo If advanced adenoma, repeat in 1 y If no advanced adenoma, repeat in 3 y, then every 5 y   PET/CT scan is not indicated       Orders:    CBC and differential; Standing    Comprehensive metabolic panel; Standing    CEA; Standing    CT chest abdomen pelvis w contrast; Future      Encounter for screening mammogram for malignant neoplasm of breast    Orders:    Mammo screening bilateral w 3d and cad; Future    Primary hypertension  On metoprolol on losartan.  Continue management as per PCP.           Other specified hypothyroidism  On levothyroxine.   Continue management as per PCP           H/O right hemicolectomy         Other hyperlipidemia  Continue statins as per PCP.               Return in about 3 months (around 7/15/2025) for Office Visit.    History of Present Illness   Chief Complaint   Patient presents with    Follow-up   History of Present Illness  This is a 83 y.o. c PMHx notable for history of stage IIIb colon cancer diagnosed in  "August 2023 status post right hemicolectomy and adjuvant 5-FU treatment,seen in consultation on 11/12/2024 for establishing care and surveillance.  She moved from [place] 2-1/2 months ago.  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     Interval History:  Patient presents today for follow-up.  She feels fine.  Is concerned about her fecal occult blood test being positive.  Denies any abdominal pain.  No weight loss.  No nausea or vomiting.  Oncology History   Cancer Staging   Malignant neoplasm of ascending colon (HCC)  Staging form: Colon and Rectum, AJCC 8th Edition  - Pathologic stage from 8/4/2023: Stage IIIB (pT3, pN1a, cM0) - Signed by David Edwards MD on 11/12/2024  Stage prefix: Initial diagnosis  Total positive nodes: 1  Oncology History   Malignant neoplasm of ascending colon (HCC)   8/4/2023 -  Cancer Staged    Staging form: Colon and Rectum, AJCC 8th Edition  - Pathologic stage from 8/4/2023: Stage IIIB (pT3, pN1a, cM0) - Signed by David Edwards MD on 11/12/2024  Stage prefix: Initial diagnosis  Total positive nodes: 1       10/8/2024 Initial Diagnosis    Malignant neoplasm of ascending colon (HCC)        Pertinent Medical History     04/15/25: Reviewed     Review of Systems  14 point review of systems was negative except that mentioned in HPI.        Objective   /62 (BP Location: Left arm, Patient Position: Sitting, Cuff Size: Adult)   Pulse 69   Temp 97.5 °F (36.4 °C) (Temporal)   Resp 17   Ht 5' 4\" (1.626 m)   Wt 68.5 kg (151 lb)   SpO2 96%   BMI 25.92 kg/m²     Pain Screening:  Pain Score:   3  ECOG   0  Physical Exam  Vitals and nursing note reviewed.   Constitutional:       General: She is not in acute distress.     Appearance: Normal appearance.   HENT:      Head: Normocephalic and atraumatic.      Mouth/Throat:      Mouth: Mucous membranes are moist.      Pharynx: Oropharynx is clear.   Eyes:      General: No scleral icterus.     " Extraocular Movements: Extraocular movements intact.      Conjunctiva/sclera: Conjunctivae normal.   Cardiovascular:      Rate and Rhythm: Normal rate and regular rhythm.      Pulses: Normal pulses.      Heart sounds: No murmur heard.  Pulmonary:      Effort: Pulmonary effort is normal.      Breath sounds: Normal breath sounds. No wheezing, rhonchi or rales.   Abdominal:      General: Bowel sounds are normal.      Palpations: Abdomen is soft.      Tenderness: There is no abdominal tenderness.   Musculoskeletal:         General: No swelling or tenderness. Normal range of motion.      Cervical back: Neck supple.   Lymphadenopathy:      Cervical: No cervical adenopathy.   Skin:     General: Skin is warm and dry.      Coloration: Skin is not pale.      Findings: No bruising, erythema or rash.   Neurological:      General: No focal deficit present.      Mental Status: She is alert and oriented to person, place, and time.      Motor: No weakness.   Psychiatric:         Mood and Affect: Mood normal.         Behavior: Behavior normal.       Physical Exam      Results    Labs: I have reviewed the following labs:  Lab Results   Component Value Date/Time    WBC 7.58 04/09/2025 09:15 AM    RBC 4.33 04/09/2025 09:15 AM    Hemoglobin 13.1 04/09/2025 09:15 AM    Hematocrit 40.4 04/09/2025 09:15 AM    MCV 93 04/09/2025 09:15 AM    MCH 30.3 04/09/2025 09:15 AM    RDW 13.2 04/09/2025 09:15 AM    Platelets 300 04/09/2025 09:15 AM    Segmented % 61 04/09/2025 09:15 AM    Lymphocytes % 28 04/09/2025 09:15 AM    Monocytes % 8 04/09/2025 09:15 AM    Eosinophils Relative 2 04/09/2025 09:15 AM    Basophils Relative 1 04/09/2025 09:15 AM    Immature Grans % 0 04/09/2025 09:15 AM    Absolute Neutrophils 4.63 04/09/2025 09:15 AM     Lab Results   Component Value Date/Time    Potassium 4.0 04/09/2025 09:15 AM    Chloride 103 04/09/2025 09:15 AM    CO2 28 04/09/2025 09:15 AM    BUN 17 04/09/2025 09:15 AM    Creatinine 0.97 04/09/2025 09:15 AM     Glucose, Fasting 111 (H) 04/09/2025 09:15 AM    Calcium 9.0 04/09/2025 09:15 AM    AST 18 04/09/2025 09:15 AM    ALT 11 04/09/2025 09:15 AM    Alkaline Phosphatase 58 04/09/2025 09:15 AM    Total Protein 6.5 04/09/2025 09:15 AM    Albumin 3.9 04/09/2025 09:15 AM    Total Bilirubin 0.64 04/09/2025 09:15 AM    eGFR 53 04/09/2025 09:15 AM             Disclaimer: This document was prepared using [employer] technology. If a word or phrase is confusing, or does not make sense, this is likely due to recognition error which was not discovered during this clinician's review. If you believe an error has occurred, please contact me through [facility] service line for [email or web address].      Follow Up    All questions were answered to the patient's satisfaction during this encounter. The patient knows the contact information for our office and knows to reach out for any relevant concerns related to this encounter. They are to call for any temperature 100.4 or higher, new symptoms including but not restricted to shaking chills, decreased appetite, nausea, vomiting, diarrhea, increased fatigue, shortness of breath or chest pain, confusion, and not feeling the strength to come to the clinic. For all other listed problems and medical diagnosis in their chart - they are managed by PCP and/or other specialists, which the patient acknowledges.     I spent 40 minutes reviewing the records (labs, clinician notes, outside records, medical history, ordering medicine/tests/procedures, monitoring of anti-neoplastic toxicities, interpreting the imaging/labs previously done) and coordination of care as well as direct time with the patient today, of which greater than 50% of the time was spent in counseling and coordination of care with the patient/family.

## 2025-04-15 NOTE — ASSESSMENT & PLAN NOTE
CT abd/Pelvis in December 2024 with no findings of recurrence or metastases in the abdomen or pelvis   CEA wnl.   Patient with no anemia.  Stool for occult blood positive.   Reached out to Dr. Kenney (Gastroenterologist) via Epic Chat. Awaiting response.       We discussed surveillance per NCCN guidelines as follows:   History and physical examination every 3-6 mo for 2 y, then every 6 mo for a total of 5 y    CEA every 3-6 mo for 2 y, then every 6 mo for a total of 5 y    Chest/abdominal/pelvic CT every 6-12 mo (category 2B for frequency <12 mo) from date of surgery for a total of 5 y    Colonoscopy in 1 y after surgery except if no preoperative colonoscopy due to obstructing lesion, colonoscopy in 3-6 mo If advanced adenoma, repeat in 1 y If no advanced adenoma, repeat in 3 y, then every 5 y   PET/CT scan is not indicated       Orders:    CBC and differential; Standing    Comprehensive metabolic panel; Standing    CEA; Standing    CT chest abdomen pelvis w contrast; Future

## 2025-04-16 ENCOUNTER — TELEPHONE (OUTPATIENT)
Age: 85
End: 2025-04-16

## 2025-04-16 ENCOUNTER — PREP FOR PROCEDURE (OUTPATIENT)
Age: 85
End: 2025-04-16

## 2025-04-16 DIAGNOSIS — R19.5 POSITIVE FIT (FECAL IMMUNOCHEMICAL TEST): Primary | ICD-10-CM

## 2025-04-16 DIAGNOSIS — Z85.038 PERSONAL HISTORY OF COLON CANCER: ICD-10-CM

## 2025-04-16 NOTE — TELEPHONE ENCOUNTER
Scheduled date of colonoscopy (as of today): 5/5/25  Physician performing colonoscopy: Anne Marie  Location of colonoscopy: [place]  Bowel prep reviewed with patient: Miralax- mailed  Instructions reviewed with patient by: Tsering ALVES  Clearances:

## 2025-04-16 NOTE — TELEPHONE ENCOUNTER
----- Message from Christian Kenney MD sent at 4/15/2025  4:57 PM EDT -----  Hi can we schedule patient for colonoscopy for +FIT test and history of colon cancer. Thanks.

## 2025-04-17 DIAGNOSIS — E03.9 ACQUIRED HYPOTHYROIDISM: ICD-10-CM

## 2025-04-18 RX ORDER — LEVOTHYROXINE SODIUM 88 UG/1
88 TABLET ORAL DAILY
Qty: 90 TABLET | Refills: 1 | Status: SHIPPED | OUTPATIENT
Start: 2025-04-18

## 2025-05-05 ENCOUNTER — ANESTHESIA (OUTPATIENT)
Dept: GASTROENTEROLOGY | Facility: HOSPITAL | Age: 85
End: 2025-05-05
Payer: MEDICARE

## 2025-05-05 ENCOUNTER — ANESTHESIA EVENT (OUTPATIENT)
Dept: GASTROENTEROLOGY | Facility: HOSPITAL | Age: 85
End: 2025-05-05
Payer: MEDICARE

## 2025-05-05 ENCOUNTER — HOSPITAL ENCOUNTER (OUTPATIENT)
Dept: GASTROENTEROLOGY | Facility: HOSPITAL | Age: 85
Setting detail: OUTPATIENT SURGERY
Discharge: HOME | End: 2025-05-05
Attending: INTERNAL MEDICINE
Payer: MEDICARE

## 2025-05-05 VITALS
SYSTOLIC BLOOD PRESSURE: 150 MMHG | BODY MASS INDEX: 28.39 KG/M2 | HEIGHT: 60 IN | RESPIRATION RATE: 16 BRPM | DIASTOLIC BLOOD PRESSURE: 67 MMHG | TEMPERATURE: 98.7 F | WEIGHT: 144.62 LBS | OXYGEN SATURATION: 97 % | HEART RATE: 71 BPM

## 2025-05-05 DIAGNOSIS — R19.5 POSITIVE FIT (FECAL IMMUNOCHEMICAL TEST): ICD-10-CM

## 2025-05-05 DIAGNOSIS — Z85.038 PERSONAL HISTORY OF COLON CANCER: ICD-10-CM

## 2025-05-05 LAB — GLUCOSE SERPL-MCNC: 107 MG/DL (ref 65–140)

## 2025-05-05 PROCEDURE — 88305 TISSUE EXAM BY PATHOLOGIST: CPT | Performed by: PATHOLOGY

## 2025-05-05 PROCEDURE — 82948 REAGENT STRIP/BLOOD GLUCOSE: CPT

## 2025-05-05 PROCEDURE — 45380 COLONOSCOPY AND BIOPSY: CPT | Performed by: INTERNAL MEDICINE

## 2025-05-05 RX ORDER — SODIUM CHLORIDE, SODIUM LACTATE, POTASSIUM CHLORIDE, CALCIUM CHLORIDE 600; 310; 30; 20 MG/100ML; MG/100ML; MG/100ML; MG/100ML
INJECTION, SOLUTION INTRAVENOUS CONTINUOUS PRN
Status: DISCONTINUED | OUTPATIENT
Start: 2025-05-05 | End: 2025-05-05

## 2025-05-05 RX ORDER — LIDOCAINE HYDROCHLORIDE 10 MG/ML
INJECTION, SOLUTION EPIDURAL; INFILTRATION; INTRACAUDAL; PERINEURAL AS NEEDED
Status: DISCONTINUED | OUTPATIENT
Start: 2025-05-05 | End: 2025-05-05

## 2025-05-05 RX ORDER — PROPOFOL 10 MG/ML
INJECTION, EMULSION INTRAVENOUS AS NEEDED
Status: DISCONTINUED | OUTPATIENT
Start: 2025-05-05 | End: 2025-05-05

## 2025-05-05 RX ORDER — LIDOCAINE HYDROCHLORIDE 10 MG/ML
0.5 INJECTION, SOLUTION EPIDURAL; INFILTRATION; INTRACAUDAL; PERINEURAL ONCE AS NEEDED
Status: CANCELLED | OUTPATIENT
Start: 2025-05-05

## 2025-05-05 RX ORDER — SODIUM CHLORIDE, SODIUM LACTATE, POTASSIUM CHLORIDE, CALCIUM CHLORIDE 600; 310; 30; 20 MG/100ML; MG/100ML; MG/100ML; MG/100ML
20 INJECTION, SOLUTION INTRAVENOUS CONTINUOUS
Status: CANCELLED | OUTPATIENT
Start: 2025-05-05

## 2025-05-05 RX ADMIN — PROPOFOL 30 MG: 10 INJECTION, EMULSION INTRAVENOUS at 08:33

## 2025-05-05 RX ADMIN — PROPOFOL 30 MG: 10 INJECTION, EMULSION INTRAVENOUS at 08:36

## 2025-05-05 RX ADMIN — PROPOFOL 70 MG: 10 INJECTION, EMULSION INTRAVENOUS at 08:31

## 2025-05-05 RX ADMIN — SODIUM CHLORIDE, SODIUM LACTATE, POTASSIUM CHLORIDE, AND CALCIUM CHLORIDE: .6; .31; .03; .02 INJECTION, SOLUTION INTRAVENOUS at 08:24

## 2025-05-05 RX ADMIN — PROPOFOL 30 MG: 10 INJECTION, EMULSION INTRAVENOUS at 08:39

## 2025-05-05 RX ADMIN — LIDOCAINE HYDROCHLORIDE 50 MG: 10 INJECTION, SOLUTION EPIDURAL; INFILTRATION; INTRACAUDAL; PERINEURAL at 08:31

## 2025-05-05 NOTE — ANESTHESIA PREPROCEDURE EVALUATION
Procedure:  COLONOSCOPY    Relevant Problems   CARDIO   (+) Other hyperlipidemia   (+) Primary hypertension      ENDO   (+) Other specified hypothyroidism      GI/HEPATIC   (+) Malignant neoplasm of ascending colon (HCC)      /RENAL   (+) Stage 3 chronic kidney disease (HCC)      HEMATOLOGY   (+) Iron deficiency anemia due to chronic blood loss      Denies recent fever, cough or other symptom of upper respiratory tract infection.    Confirmed NPO appropriate    Physical Exam    Airway    Mallampati score: II  TM Distance: >3 FB  Neck ROM: full     Dental        Cardiovascular      Pulmonary      Other Findings  post-pubertal.      Anesthesia Plan  ASA Score- 3     Anesthesia Type- IV sedation with anesthesia with ASA Monitors.         Additional Monitors:     Airway Plan:            Plan Factors-Exercise tolerance (METS): >4 METS.    Chart reviewed.   Existing labs reviewed.                   Induction- intravenous.    Postoperative Plan-     Perioperative Resuscitation Plan - Level 1 - Full Code.       Informed Consent- Anesthetic plan and risks discussed with patient.        NPO Status:  Vitals Value Taken Time   Date of last liquid 05/05/25 05/05/25 0726   Time of last liquid 0630 05/05/25 0726   Date of last solid 05/03/25 05/05/25 0726   Time of last solid 1800 05/05/25 0726

## 2025-05-05 NOTE — ANESTHESIA POSTPROCEDURE EVALUATION
Post-Op Assessment Note    CV Status:  Stable  Pain Score: 0    Pain management: adequate       Mental Status:  Alert and awake   Hydration Status:  Euvolemic   PONV Controlled:  Controlled   Airway Patency:  Patent     Post Op Vitals Reviewed: Yes    No anethesia notable event occurred.    Staff: CRNA           Last Filed PACU Vitals:  Vitals Value Taken Time   Temp 98.7 °F (37.1 °C) 05/05/25 0845   Pulse 78 05/05/25 0845   /63 05/05/25 0845   Resp 16 05/05/25 0845   SpO2 100 % 05/05/25 0845

## 2025-05-05 NOTE — H&P
History and Physical - [facility]  Delores Crespo 84 y.o. female MRN: [id number]                  HPI: Delores Crespo is a 84 y.o. year old female who presents for colonoscopy for +FIT      REVIEW OF SYSTEMS: Per the HPI, and otherwise unremarkable.    Historical Information   Past Medical History:   Diagnosis Date    Colon cancer (HCC)     2024    Diverticulitis of colon     GERD (gastroesophageal reflux disease)     H/O blood clots     High cholesterol     Hypertension      Past Surgical History:   Procedure Laterality Date    COLONOSCOPY      COLONOSCOPY      LAPAROSCOPIC COLON RESECTION      TUBAL LIGATION       Social History   Social History     Substance and Sexual Activity   Alcohol Use Not Currently     Social History     Substance and Sexual Activity   Drug Use Not Currently    Types: Marijuana     Social History     Tobacco Use   Smoking Status Former    Current packs/day: 0.00    Average packs/day: 0.3 packs/day for 5.0 years (1.3 ttl pk-yrs)    Types: Cigarettes    Start date: 1959    Quit date: 10/8/1964    Years since quittin.6    Passive exposure: Never   Smokeless Tobacco Never     Family History   Problem Relation Age of Onset    Hodgkin's lymphoma Mother         non hodgkin    Asthma Mother     Cancer Mother     Heart attack Father     Heart disease Father     No Known Problems Brother     Parkinsonism Brother     No Known Problems Sister        Meds/Allergies       Current Outpatient Medications:     acetaminophen (TYLENOL) 500 mg tablet    Cholecalciferol (Vitamin D) 50 MCG (2000 UT) CAPS    cyanocobalamin (VITAMIN B-12) 100 mcg tablet    famotidine (PEPCID) 20 mg tablet    FeroSul 325 (65 Fe) MG tablet    latanoprost (XALATAN) 0.005 % ophthalmic solution    levothyroxine 88 mcg tablet    losartan (COZAAR) 25 mg tablet    Magnesium 250 MG TABS    melatonin 3 mg    metFORMIN (GLUCOPHAGE) 500 mg tablet    metoprolol succinate (TOPROL-XL) 25 mg 24 hr tablet     potassium chloride (Klor-Con) 10 mEq tablet    Pyridoxine HCl (VITAMIN B-6 PO)    simvastatin (ZOCOR) 80 mg tablet    Thiamine HCl (VITAMIN B-1 PO)    timolol (BETIMOL) 0.25 % ophthalmic solution    timolol (TIMOPTIC) 0.25 % ophthalmic solution    Allergies   Allergen Reactions    Alphagan [Brimonidine] Other (See Comments)     Doesn't remember    Baytril [Enrofloxacin] Other (See Comments)     Pt don't remember reaction       Objective     /69   Pulse 71   Temp (!) 97.3 °F (36.3 °C) (Temporal)   Resp 15   Ht 5' (1.524 m)   Wt 65.6 kg (144 lb 10 oz)   SpO2 99%   BMI 28.24 kg/m²       PHYSICAL EXAM    Gen: NAD  Head: NCAT  CV: RRR  CHEST: Clear  ABD: soft, NT/ND  EXT: no edema      ASSESSMENT/PLAN:  Delores Crespo is a 84 y.o. year old female who presents for colonoscopy for +FIT The patient is stable and optimized for the procedure, we reviewed risk and benefits. Risk include but not limited to infection, bleeding, perforation and missing a lesion.

## 2025-05-07 ENCOUNTER — APPOINTMENT (OUTPATIENT)
Age: 85
End: 2025-05-07
Attending: STUDENT IN AN ORGANIZED HEALTH CARE EDUCATION/TRAINING PROGRAM
Payer: MEDICARE

## 2025-05-07 ENCOUNTER — OFFICE VISIT (OUTPATIENT)
Age: 85
End: 2025-05-07
Payer: MEDICARE

## 2025-05-07 VITALS
HEIGHT: 60 IN | SYSTOLIC BLOOD PRESSURE: 148 MMHG | HEART RATE: 65 BPM | BODY MASS INDEX: 28.74 KG/M2 | TEMPERATURE: 97.9 F | RESPIRATION RATE: 18 BRPM | WEIGHT: 146.4 LBS | OXYGEN SATURATION: 97 % | DIASTOLIC BLOOD PRESSURE: 68 MMHG

## 2025-05-07 DIAGNOSIS — K21.9 GASTROESOPHAGEAL REFLUX DISEASE, UNSPECIFIED WHETHER ESOPHAGITIS PRESENT: ICD-10-CM

## 2025-05-07 DIAGNOSIS — R19.5 LOOSE STOOLS: Primary | ICD-10-CM

## 2025-05-07 DIAGNOSIS — E03.9 ACQUIRED HYPOTHYROIDISM: ICD-10-CM

## 2025-05-07 DIAGNOSIS — E78.49 OTHER HYPERLIPIDEMIA: ICD-10-CM

## 2025-05-07 DIAGNOSIS — R73.03 PREDIABETES: ICD-10-CM

## 2025-05-07 DIAGNOSIS — R35.0 URINARY FREQUENCY: ICD-10-CM

## 2025-05-07 DIAGNOSIS — I10 PRIMARY HYPERTENSION: ICD-10-CM

## 2025-05-07 LAB
EST. AVERAGE GLUCOSE BLD GHB EST-MCNC: 128 MG/DL
HBA1C MFR BLD: 6.1 %
TSH SERPL DL<=0.05 MIU/L-ACNC: 4.17 UIU/ML (ref 0.45–4.5)

## 2025-05-07 PROCEDURE — G2211 COMPLEX E/M VISIT ADD ON: HCPCS | Performed by: STUDENT IN AN ORGANIZED HEALTH CARE EDUCATION/TRAINING PROGRAM

## 2025-05-07 PROCEDURE — 83036 HEMOGLOBIN GLYCOSYLATED A1C: CPT

## 2025-05-07 PROCEDURE — 36415 COLL VENOUS BLD VENIPUNCTURE: CPT

## 2025-05-07 PROCEDURE — 84443 ASSAY THYROID STIM HORMONE: CPT

## 2025-05-07 PROCEDURE — 99214 OFFICE O/P EST MOD 30 MIN: CPT | Performed by: STUDENT IN AN ORGANIZED HEALTH CARE EDUCATION/TRAINING PROGRAM

## 2025-05-07 RX ORDER — LOSARTAN POTASSIUM 50 MG/1
50 TABLET ORAL DAILY
Qty: 90 TABLET | Refills: 0 | Status: SHIPPED | OUTPATIENT
Start: 2025-05-07

## 2025-05-07 RX ORDER — METOPROLOL SUCCINATE 25 MG/1
25 TABLET, EXTENDED RELEASE ORAL DAILY
Qty: 90 TABLET | Refills: 0 | Status: SHIPPED | OUTPATIENT
Start: 2025-05-07

## 2025-05-07 RX ORDER — IMIPRAMINE HYDROCHLORIDE 25 MG/1
TABLET, FILM COATED ORAL
COMMUNITY
Start: 2025-04-23 | End: 2025-05-07 | Stop reason: ALTCHOICE

## 2025-05-07 RX ORDER — FAMOTIDINE 20 MG/1
20 TABLET, FILM COATED ORAL DAILY
Qty: 90 TABLET | Refills: 0 | Status: SHIPPED | OUTPATIENT
Start: 2025-05-07

## 2025-05-07 RX ORDER — POTASSIUM CHLORIDE 750 MG/1
10 TABLET, EXTENDED RELEASE ORAL DAILY
Qty: 90 TABLET | Refills: 0 | Status: SHIPPED | OUTPATIENT
Start: 2025-05-07

## 2025-05-07 RX ORDER — SIMVASTATIN 80 MG
80 TABLET ORAL
Qty: 90 TABLET | Refills: 0 | Status: SHIPPED | OUTPATIENT
Start: 2025-05-07

## 2025-05-07 RX ORDER — LEVOTHYROXINE SODIUM 88 UG/1
88 TABLET ORAL DAILY
Qty: 90 TABLET | Refills: 1 | Status: SHIPPED | OUTPATIENT
Start: 2025-05-07

## 2025-05-07 NOTE — PROGRESS NOTES
Name: Delores Crespo      : 1940      MRN: [id number]  Encounter Provider: Salo Calvillo MD  Encounter Date: 2025   Encounter department: [facility] [place]  :  Assessment & Plan  Loose stools  Suspect this is normal stool habits as she is only having 2-3 stools per day of varying caliber. No blood in stool. She had unremarkable colonoscopy, CT AP, and infectious stool studies in last 6 months. Could also be food sensitivity. She saw GI for this 3 months ago and they recommended metamucil, probiotic, and avoidance of dairy but she was not aware. Made these same recommendations today. Also asked her to keep food diary.       Primary hypertension  BP elevated. Increase losartan to 50 mg qd. Continue Toprol XL 25 mg qd.    Orders:    metoprolol succinate (TOPROL-XL) 25 mg 24 hr tablet; Take 1 tablet (25 mg total) by mouth daily    potassium chloride (Klor-Con) 10 mEq tablet; Take 1 tablet (10 mEq total) by mouth in the morning    losartan (COZAAR) 50 mg tablet; Take 1 tablet (50 mg total) by mouth daily    Urinary frequency  She reports some days she urinates every 20 minutes in the morning. No other associated symptoms. Check UA, TSH, A1c to look for organic causes of increased urinary frequency.    Orders:    UA w Reflex to Microscopic w Reflex to Culture; Future    Acquired hypothyroidism  Due for repeat TSH. Continue levothyroxine 88 mcg unless TSH levels are abnormal.    Orders:    TSH, 3rd generation with Free T4 reflex; Future    levothyroxine 88 mcg tablet; Take 1 tablet (88 mcg total) by mouth daily    Prediabetes  Last A1c 6.0. Recheck A1c. Continue metformin 500 mg qd.    Orders:    Hemoglobin A1C; Future    metFORMIN (GLUCOPHAGE) 500 mg tablet; Take 1 tablet (500 mg total) by mouth daily with breakfast    Gastroesophageal reflux disease, unspecified whether esophagitis present    Orders:    famotidine (PEPCID) 20 mg tablet; Take 1 tablet (20 mg total) by mouth  daily    Other hyperlipidemia  Lipids well controlled 6 months ago. Continue Zocor 80 mg qd.    Orders:    simvastatin (ZOCOR) 80 mg tablet; Take 1 tablet (80 mg total) by mouth daily at bedtime           History of Present Illness   Delores Crespo is an 83 yo F with PMH of colon cancer s/p right hemicolectomy, HTN, HLD, ROJELIO, thyroid nodule, lung nodule, prediabetes and hypothyroidism who presents today for diarrhea. She had colonoscopy done 2 days ago for positive FIT test. Overall, colonoscopy appeared unremarkable. She reports 3-4 months of 2-3 stools per day. Sometimes loose, sometimes watery, sometimes formed. She denies blood in the stool though she has had blood on the toilet paper in the past. She does not know what color her stool is. She actually was seen by GI 6 months ago complaining of the same. She was recommended metamucil and probiotic but was not aware. She has had CT scan of abdomen and pelvis, infectious stool studies, and colonoscopy in the last 6 months, all of which were negative. She reports that she urinates very frequently. In the morning, sometimes she urinates every 20 minutes. She denies suprapubic/flank pain, dysuria, hematuria, or urgency.      Review of Systems   Constitutional:  Negative for appetite change, chills and fever.   Respiratory:  Negative for cough and shortness of breath.    Cardiovascular:  Negative for chest pain and leg swelling.   Gastrointestinal:  Positive for diarrhea. Negative for abdominal pain, blood in stool, constipation and nausea.   Genitourinary:  Positive for frequency. Negative for difficulty urinating, dysuria, flank pain, hematuria, pelvic pain and urgency.   Musculoskeletal:  Negative for arthralgias and myalgias.   Neurological:  Negative for dizziness, light-headedness and headaches.       Objective   /70 (BP Location: Left arm, Patient Position: Sitting, Cuff Size: Standard)   Pulse 65   Temp 97.9 °F (36.6 °C) (Tympanic)   Resp 18   Ht 5'  (1.524 m)   Wt 66.4 kg (146 lb 6.4 oz)   SpO2 97%   BMI 28.59 kg/m²      Physical Exam  Constitutional:       General: She is not in acute distress.  Eyes:      Conjunctiva/sclera: Conjunctivae normal.   Cardiovascular:      Rate and Rhythm: Normal rate and regular rhythm.      Heart sounds: Normal heart sounds.   Pulmonary:      Effort: Pulmonary effort is normal.      Breath sounds: Normal breath sounds.   Abdominal:      General: There is no distension.      Tenderness: There is no abdominal tenderness.   Musculoskeletal:      Right lower leg: No edema.      Left lower leg: No edema.   Skin:     General: Skin is warm and dry.   Neurological:      Mental Status: She is alert.   Psychiatric:         Speech: Speech normal.         Behavior: Behavior normal. Behavior is cooperative.

## 2025-05-07 NOTE — ASSESSMENT & PLAN NOTE
Lipids well controlled 6 months ago. Continue Zocor 80 mg qd.    Orders:    simvastatin (ZOCOR) 80 mg tablet; Take 1 tablet (80 mg total) by mouth daily at bedtime

## 2025-05-07 NOTE — ASSESSMENT & PLAN NOTE
BP elevated. Increase losartan to 50 mg qd. Continue Toprol XL 25 mg qd.    Orders:    metoprolol succinate (TOPROL-XL) 25 mg 24 hr tablet; Take 1 tablet (25 mg total) by mouth daily    potassium chloride (Klor-Con) 10 mEq tablet; Take 1 tablet (10 mEq total) by mouth in the morning    losartan (COZAAR) 50 mg tablet; Take 1 tablet (50 mg total) by mouth daily

## 2025-05-07 NOTE — ASSESSMENT & PLAN NOTE
Last A1c 6.0. Recheck A1c. Continue metformin 500 mg qd.    Orders:    Hemoglobin A1C; Future    metFORMIN (GLUCOPHAGE) 500 mg tablet; Take 1 tablet (500 mg total) by mouth daily with breakfast

## 2025-05-08 PROCEDURE — 88305 TISSUE EXAM BY PATHOLOGIST: CPT | Performed by: PATHOLOGY

## 2025-05-09 ENCOUNTER — RESULTS FOLLOW-UP (OUTPATIENT)
Age: 85
End: 2025-05-09

## 2025-05-09 DIAGNOSIS — R35.0 URINARY FREQUENCY: Primary | ICD-10-CM

## 2025-05-09 NOTE — TELEPHONE ENCOUNTER
PLEASE READ THE PATIENT INSTRUCTIONS BELOW FOR IMPORTANT INFORMATION ABOUT YOUR VISIT TODAY AND NEXT STEPS:    Schedule your follow up appointment today (before you leave) in 4 -5 months as an in-person visit or a video visit (on Tuesdays ideally).  Keep in mind that parking may be a challenge so plan accordingly.   For a video visit, be sure to set up your technology at least 1-2 days before the appointment. .    Plan to arrive to the office (or check in online) 15 min prior to your appointment time as the check in process is lengthier than in prior years  Call the office at least 1 day prior to your appointment if you need to cancel.  This allows us to accommodate other patients who need to be seen.  Try to focus on conversations/names to see effect on memory/recall  Can consider [facility] evaluation in the future if desired  I suggest regular meals during the day and daily exercise regimen  Consider going off of non-necessary medicines like pain pills /narcotics for 3-4 weeks to see effect on cognition (check with your medical doctors to make sure this is ok)  I suggest meeting with a psychologist for depression/anxiety/mood   Schedule a neuropsychology evaluation - cancel appointment if memory/recall is better after focusing/changing lifestyle       Vm not set up

## 2025-05-09 NOTE — TELEPHONE ENCOUNTER
Pt called back and was read her lab results from Lacie.  She understood and has no questions at this time.

## 2025-05-09 NOTE — TELEPHONE ENCOUNTER
----- Message from Lacie Menjivar PA-C sent at 5/9/2025  9:08 AM EDT -----  A1c is still in the prediabetic range.  Continue metformin 500 mg daily and routine follow-up with Dr. Calvillo

## 2025-05-12 ENCOUNTER — RESULTS FOLLOW-UP (OUTPATIENT)
Dept: GASTROENTEROLOGY | Facility: CLINIC | Age: 85
End: 2025-05-12

## 2025-05-13 ENCOUNTER — OFFICE VISIT (OUTPATIENT)
Age: 85
End: 2025-05-13
Payer: MEDICARE

## 2025-05-13 ENCOUNTER — APPOINTMENT (OUTPATIENT)
Age: 85
End: 2025-05-13
Attending: STUDENT IN AN ORGANIZED HEALTH CARE EDUCATION/TRAINING PROGRAM
Payer: MEDICARE

## 2025-05-13 VITALS
HEART RATE: 72 BPM | OXYGEN SATURATION: 98 % | DIASTOLIC BLOOD PRESSURE: 92 MMHG | HEIGHT: 60 IN | SYSTOLIC BLOOD PRESSURE: 126 MMHG | BODY MASS INDEX: 29.45 KG/M2 | WEIGHT: 150 LBS | TEMPERATURE: 98.2 F

## 2025-05-13 DIAGNOSIS — D22.9 MULTIPLE MELANOCYTIC NEVI: ICD-10-CM

## 2025-05-13 DIAGNOSIS — L82.1 SEBORRHEIC KERATOSIS: ICD-10-CM

## 2025-05-13 DIAGNOSIS — D18.01 CHERRY ANGIOMA: ICD-10-CM

## 2025-05-13 DIAGNOSIS — Z13.89 SCREENING FOR SKIN CONDITION: Primary | ICD-10-CM

## 2025-05-13 DIAGNOSIS — L57.0 KERATOSIS, ACTINIC: ICD-10-CM

## 2025-05-13 LAB
BACTERIA UR QL AUTO: ABNORMAL /HPF
BILIRUB UR QL STRIP: NEGATIVE
CLARITY UR: CLEAR
COLOR UR: ABNORMAL
GLUCOSE UR STRIP-MCNC: NEGATIVE MG/DL
HGB UR QL STRIP.AUTO: ABNORMAL
KETONES UR STRIP-MCNC: NEGATIVE MG/DL
LEUKOCYTE ESTERASE UR QL STRIP: ABNORMAL
NITRITE UR QL STRIP: NEGATIVE
NON-SQ EPI CELLS URNS QL MICRO: ABNORMAL /HPF
PH UR STRIP.AUTO: 6 [PH]
PROT UR STRIP-MCNC: NEGATIVE MG/DL
RBC #/AREA URNS AUTO: ABNORMAL /HPF
SP GR UR STRIP.AUTO: 1.02 (ref 1–1.03)
UROBILINOGEN UR STRIP-ACNC: <2 MG/DL
WBC #/AREA URNS AUTO: ABNORMAL /HPF

## 2025-05-13 PROCEDURE — 99204 OFFICE O/P NEW MOD 45 MIN: CPT | Performed by: STUDENT IN AN ORGANIZED HEALTH CARE EDUCATION/TRAINING PROGRAM

## 2025-05-13 PROCEDURE — 17003 DESTRUCT PREMALG LES 2-14: CPT | Performed by: STUDENT IN AN ORGANIZED HEALTH CARE EDUCATION/TRAINING PROGRAM

## 2025-05-13 PROCEDURE — 17000 DESTRUCT PREMALG LESION: CPT | Performed by: STUDENT IN AN ORGANIZED HEALTH CARE EDUCATION/TRAINING PROGRAM

## 2025-05-13 PROCEDURE — 81001 URINALYSIS AUTO W/SCOPE: CPT

## 2025-05-13 RX ORDER — FLUOROURACIL 50 MG/G
CREAM TOPICAL 2 TIMES DAILY
Qty: 40 G | Refills: 0 | Status: SHIPPED | OUTPATIENT
Start: 2025-05-13

## 2025-05-13 NOTE — PATIENT INSTRUCTIONS
ACTINIC KERATOSIS  Assessment and Plan:  Based on a thorough discussion of this condition and the management approach to it (including a comprehensive discussion of the known risks, side effects and potential benefits of treatment), the patient (family) agrees to implement the following specific plan:    Fluorouracil (Efudex) 5% cream from elbows down to wrists twice a day for 14 days.   Fluorouracil (Efudex) 5% cream on face twice a day for 14 days starting in September.   ACTINIC KERATOSIS WITH CRYOSURGERY PROCEDURE  PROCEDURE:  DESTRUCTION OF PRE-MALIGNANT LESIONS  After a thorough discussion of treatment options and risk/benefits/alternatives (including but not limited to local pain, scarring, dyspigmentation, blistering, and possible superinfection), verbal and written consent were obtained and the aforementioned lesions were treated on with cryotherapy using liquid nitrogen x 1 cycle for 5-10 seconds.    TOTAL NUMBER of 7 pre-malignant lesions were treated today on the ANATOMIC LOCATION: left upper back- 1 left upper arm- 6.     The patient tolerated the procedure well, and after-care instructions were provided.      Actinic keratoses are very common on sites repeatedly exposed to the sun, especially the backs of the hands and the face, most often affecting the ears, nose, cheeks, upper lip, vermilion of the lower lip, temples, forehead and balding scalp. In severely chronically sun-damaged individuals, they may also be found on the upper trunk, upper and lower limbs, and dorsum of feet.    We discussed the theoretical premalignant (“pre-cancerous”) nature and etiology of these growths.  We discussed the prevailing notion that actinic keratoses are a reflection of abnormal skin cell development due to DNA damage by short wavelength UVB.  They are more likely to appear if the immune function is poor, due to aging, recent sun exposure, predisposing disease or certain drugs.    We discussed that the main concern  is that actinic keratoses may predispose to squamous cell carcinoma. It is rare for a solitary actinic keratosis to evolve to squamous cell carcinoma (SCC), but the risk of SCC occurring at some stage in a patient with more than 10 actinic keratoses is thought to be about 10 to 15%. A tender, thickened, ulcerated or enlarging actinic keratosis is suspicious of SCC.    Actinic keratoses may be prevented by strict sun protection. If already present, keratoses may improve with a very high sun protection factor (50+) broad-spectrum sunscreen applied at least daily to affected areas, year-round.  We recommend that UPF-rated clothing and hats and sunglasses be worn whenever possible and that a sunscreen-moisturizer combination product such as Neutrogena Daily Defense be applied at least three times a day.    We performed a thorough discussion of treatment options and specific risk/benefits/alternatives including but not limited to medical “field” treatment with medications such as the following:    Topical “field area” medications such as 5-fluorouracil or Aldara (specifically, the trouble with long-term compliance, blistering and local skin reaction versus the convenience of at-home therapy and that field therapy “gets what is not yet seen”).    Cryotherapy (specifically, local pain, scarring, dyspigmentation, blistering, possible superinfection, and treats “only what we see” versus directed treatment today).    Photodynamic therapy (specifically, local pain, scarring, dyspigmentation, blistering, possible superinfection, need to schedule for a later date, and time spent in the office versus field therapy that “gets what is not yet seen”).

## 2025-05-14 NOTE — TELEPHONE ENCOUNTER
----- Message from Lacie Menjivar PA-C sent at 5/14/2025  8:16 AM EDT -----  There is a small amount of blood and white blood cells in the patient's urine, but no evidence of infection.  I would recommend repeating the urinalysis prior to follow-up with Dr. Calvillo in July to   see if these findings persist.  ----- Message -----  From: Lab, Background User  Sent: 5/7/2025  11:19 PM EDT  To: Salo Calvillo MD

## 2025-05-14 NOTE — TELEPHONE ENCOUNTER
Patient called for update, relayed message from Lacie. Patient states she does not agree and is not happy. She is requesting a call back from Dr. Calvillo today to discuss.

## 2025-05-14 NOTE — TELEPHONE ENCOUNTER
Patient returned call stating she looked on her mychart and is concerned with the abnormal UA results. She states she does not want to wait until July. Although the results do not indicate an infection she would like for Provider to prescribe antibiotics to prevent one.       She is requesting a call back with update. Please advise. [phone]

## 2025-05-15 NOTE — TELEPHONE ENCOUNTER
----- Message from Salo Calvillo MD sent at 5/15/2025  7:23 AM EDT -----  Patient does not have UTI. No bacteria in her urine. Small amount of white blood cells does not indicate UTI  ----- Message -----  From: Linh Daniel MA  Sent: 5/14/2025   4:25 PM EDT  To: Salo Calvillo MD      ----- Message -----  From: Stephanie Mcmahon  Sent: 5/14/2025   2:51 PM EDT  To: [facility]    ----- Message from Stephanie Mcmahon sent at 5/14/2025  2:51 PM EDT -----

## 2025-05-15 NOTE — TELEPHONE ENCOUNTER
Spoke with patient relayed Dr Elias message patient stated she has other concerns made appointment for 9:40 am tomorrow

## 2025-05-16 ENCOUNTER — OFFICE VISIT (OUTPATIENT)
Age: 85
End: 2025-05-16
Payer: MEDICARE

## 2025-05-16 VITALS
WEIGHT: 149.8 LBS | RESPIRATION RATE: 18 BRPM | SYSTOLIC BLOOD PRESSURE: 148 MMHG | HEIGHT: 60 IN | BODY MASS INDEX: 29.41 KG/M2 | DIASTOLIC BLOOD PRESSURE: 70 MMHG | TEMPERATURE: 98 F | OXYGEN SATURATION: 98 % | HEART RATE: 74 BPM

## 2025-05-16 DIAGNOSIS — R35.0 URINARY FREQUENCY: Primary | ICD-10-CM

## 2025-05-16 PROCEDURE — 99213 OFFICE O/P EST LOW 20 MIN: CPT | Performed by: STUDENT IN AN ORGANIZED HEALTH CARE EDUCATION/TRAINING PROGRAM

## 2025-05-16 PROCEDURE — G2211 COMPLEX E/M VISIT ADD ON: HCPCS | Performed by: STUDENT IN AN ORGANIZED HEALTH CARE EDUCATION/TRAINING PROGRAM

## 2025-05-16 NOTE — PROGRESS NOTES
"Name: Delores Crespo      : 1940      MRN: [id number]  Encounter Provider: Salo Calvillo MD  Encounter Date: 2025   Encounter department: [facility]  :  Assessment & Plan  Urinary frequency  Suspect her increased urinary frequency, that is only happening in the morning, is more due to how much she is drinking water before bed and upon awakening. Her abdomen is soft and nondistended. She had UA done last week that was negative for UTI but did show small amount of WBC and RBCs. Advised that she does not need antibiotics given there is no bacteria seen in her urine. Advised that we can follow up RBCs in a few months with repeat UA. Due to her urinary complaints and complaint of abdominal distention, will order US kidney and bladder with PVR which she can obtain if symptoms persist. Of note, she is scheduled for CT CAP from her Oncologist in 1 month so if she does not get the US, then at least she has this imaging coming up to further evaluate. For now, will proceed with conservative measures like decreasing the amount of water she drinks before bedtime and timed voiding throughout the day while monitoring for worsening symptoms.    Orders:    US kidney and bladder with pvr; Future           History of Present Illness   Delores Crespo is an 83 yo F with PMH of colon cancer s/p right hemicolectomy, HTN, HLD, ROJELIO, thyroid nodule, lung nodule, prediabetes and hypothyroidism who presents today for urinary frequency and \"hard stomach\". Patient reports she has long history, though has been off and on, of increased urinary frequency specifically in the morning. She reports that in the morning she has 1.5 bottles of water and has to urinate every 20 minutes or so. This resolves by the afternoon and then she urinates every 2-3 hours. She drinks total of 3-4 bottles of water in the day, including drinking right before bed time. She has to urinate 2 times usually in the night. She denies " hematuria, dysuria, urinary incontinence, dribbling, sensation of incomplete emptying, suprapubic or flank pain, vaginal itching/rash, fever, or chills. She had UA done last week as she was seen for physical and complained of urinary frequency. This was negative for UTI. It did show small amount of WBC and RBC and so she became very concerned as google suggested this may be sign of bladder cancer. She reports she feels like her lower abdomen is hard. Noticeable over the last two days. She had a BM today.      Review of Systems   Constitutional:  Negative for chills and fever.   Respiratory:  Negative for shortness of breath.    Cardiovascular:  Negative for chest pain and leg swelling.   Gastrointestinal:  Negative for abdominal pain, constipation, diarrhea and nausea.   Genitourinary:  Positive for frequency. Negative for decreased urine volume, difficulty urinating, dysuria, flank pain, hematuria, pelvic pain, urgency and vaginal pain.   Musculoskeletal:  Negative for arthralgias.   Neurological:  Negative for dizziness, light-headedness and headaches.       Objective   /70 (BP Location: Left arm, Patient Position: Sitting, Cuff Size: Standard)   Pulse 74   Temp 98 °F (36.7 °C) (Tympanic)   Resp 18   Ht 5' (1.524 m)   Wt 67.9 kg (149 lb 12.8 oz)   SpO2 98%   BMI 29.26 kg/m²      Physical Exam  Constitutional:       General: She is not in acute distress.    Eyes:      Conjunctiva/sclera: Conjunctivae normal.       Cardiovascular:      Rate and Rhythm: Normal rate and regular rhythm.      Heart sounds: Normal heart sounds.   Pulmonary:      Effort: Pulmonary effort is normal.      Breath sounds: Normal breath sounds.   Abdominal:      General: There is no distension.      Palpations: Abdomen is soft.      Tenderness: There is no abdominal tenderness. There is no right CVA tenderness or left CVA tenderness.     Musculoskeletal:      Right lower leg: No edema.      Left lower leg: No edema.     Skin:      General: Skin is warm and dry.     Neurological:      Mental Status: She is alert.     Psychiatric:         Speech: Speech normal.         Behavior: Behavior normal. Behavior is cooperative.

## 2025-05-30 ENCOUNTER — TELEPHONE (OUTPATIENT)
Dept: HEMATOLOGY ONCOLOGY | Facility: CLINIC | Age: 85
End: 2025-05-30

## 2025-05-30 NOTE — TELEPHONE ENCOUNTER
Called and s/w Delores about the change in appt due to the provider being ooo and she confirmed new appt date & time with me.

## 2025-05-31 ENCOUNTER — TELEPHONE (OUTPATIENT)
Dept: OTHER | Facility: OTHER | Age: 85
End: 2025-05-31

## 2025-05-31 NOTE — TELEPHONE ENCOUNTER
Pt called to verify vm left on her phone of new hem/onc appt day & time : 7/8/25 2:00 pm    No callback needed

## 2025-06-12 ENCOUNTER — HOSPITAL ENCOUNTER (OUTPATIENT)
Dept: CT IMAGING | Facility: HOSPITAL | Age: 85
End: 2025-06-12
Attending: INTERNAL MEDICINE
Payer: MEDICARE

## 2025-06-12 DIAGNOSIS — C18.2 MALIGNANT NEOPLASM OF ASCENDING COLON (HCC): ICD-10-CM

## 2025-06-12 PROCEDURE — 71260 CT THORAX DX C+: CPT

## 2025-06-12 PROCEDURE — 74177 CT ABD & PELVIS W/CONTRAST: CPT

## 2025-06-12 RX ADMIN — IOHEXOL 100 ML: 350 INJECTION, SOLUTION INTRAVENOUS at 09:55

## 2025-06-14 ENCOUNTER — APPOINTMENT (OUTPATIENT)
Dept: LAB | Facility: HOSPITAL | Age: 85
End: 2025-06-14
Payer: MEDICARE

## 2025-06-14 DIAGNOSIS — C18.2 MALIGNANT NEOPLASM OF ASCENDING COLON (HCC): ICD-10-CM

## 2025-06-14 LAB
ALBUMIN SERPL BCG-MCNC: 3.8 G/DL (ref 3.5–5)
ALP SERPL-CCNC: 59 U/L (ref 34–104)
ALT SERPL W P-5'-P-CCNC: 12 U/L (ref 7–52)
ANION GAP SERPL CALCULATED.3IONS-SCNC: 6 MMOL/L (ref 4–13)
AST SERPL W P-5'-P-CCNC: 19 U/L (ref 13–39)
BASOPHILS # BLD AUTO: 0.05 THOUSANDS/ÂΜL (ref 0–0.1)
BASOPHILS NFR BLD AUTO: 1 % (ref 0–1)
BILIRUB SERPL-MCNC: 0.81 MG/DL (ref 0.2–1)
BUN SERPL-MCNC: 14 MG/DL (ref 5–25)
CALCIUM SERPL-MCNC: 9.2 MG/DL (ref 8.4–10.2)
CHLORIDE SERPL-SCNC: 103 MMOL/L (ref 96–108)
CO2 SERPL-SCNC: 29 MMOL/L (ref 21–32)
CREAT SERPL-MCNC: 0.91 MG/DL (ref 0.6–1.3)
EOSINOPHIL # BLD AUTO: 0.12 THOUSAND/ÂΜL (ref 0–0.61)
EOSINOPHIL NFR BLD AUTO: 1 % (ref 0–6)
ERYTHROCYTE [DISTWIDTH] IN BLOOD BY AUTOMATED COUNT: 13.2 % (ref 11.6–15.1)
GFR SERPL CREATININE-BSD FRML MDRD: 58 ML/MIN/1.73SQ M
GLUCOSE P FAST SERPL-MCNC: 111 MG/DL (ref 65–99)
HCT VFR BLD AUTO: 39.8 % (ref 34.8–46.1)
HGB BLD-MCNC: 12.7 G/DL (ref 11.5–15.4)
IMM GRANULOCYTES # BLD AUTO: 0.04 THOUSAND/UL (ref 0–0.2)
IMM GRANULOCYTES NFR BLD AUTO: 1 % (ref 0–2)
LYMPHOCYTES # BLD AUTO: 2.24 THOUSANDS/ÂΜL (ref 0.6–4.47)
LYMPHOCYTES NFR BLD AUTO: 27 % (ref 14–44)
MCH RBC QN AUTO: 30.5 PG (ref 26.8–34.3)
MCHC RBC AUTO-ENTMCNC: 31.9 G/DL (ref 31.4–37.4)
MCV RBC AUTO: 96 FL (ref 82–98)
MONOCYTES # BLD AUTO: 0.69 THOUSAND/ÂΜL (ref 0.17–1.22)
MONOCYTES NFR BLD AUTO: 8 % (ref 4–12)
NEUTROPHILS # BLD AUTO: 5.22 THOUSANDS/ÂΜL (ref 1.85–7.62)
NEUTS SEG NFR BLD AUTO: 62 % (ref 43–75)
NRBC BLD AUTO-RTO: 0 /100 WBCS
PLATELET # BLD AUTO: 278 THOUSANDS/UL (ref 149–390)
PMV BLD AUTO: 9.9 FL (ref 8.9–12.7)
POTASSIUM SERPL-SCNC: 4.4 MMOL/L (ref 3.5–5.3)
PROT SERPL-MCNC: 6.5 G/DL (ref 6.4–8.4)
RBC # BLD AUTO: 4.16 MILLION/UL (ref 3.81–5.12)
SODIUM SERPL-SCNC: 138 MMOL/L (ref 135–147)
WBC # BLD AUTO: 8.36 THOUSAND/UL (ref 4.31–10.16)

## 2025-06-14 PROCEDURE — 80053 COMPREHEN METABOLIC PANEL: CPT

## 2025-06-14 PROCEDURE — 82378 CARCINOEMBRYONIC ANTIGEN: CPT

## 2025-06-14 PROCEDURE — 85025 COMPLETE CBC W/AUTO DIFF WBC: CPT

## 2025-06-14 PROCEDURE — 36415 COLL VENOUS BLD VENIPUNCTURE: CPT

## 2025-06-15 LAB — CEA SERPL-MCNC: 1.5 NG/ML (ref 0–3)

## 2025-06-26 ENCOUNTER — TELEPHONE (OUTPATIENT)
Age: 85
End: 2025-06-26

## 2025-06-26 NOTE — TELEPHONE ENCOUNTER
Received call from Tiffanie with  radiology to report significant findings on a CT c/a/p study performed on 6/11.

## 2025-07-07 ENCOUNTER — TELEPHONE (OUTPATIENT)
Age: 85
End: 2025-07-07

## 2025-07-07 DIAGNOSIS — R35.0 URINARY FREQUENCY: Primary | ICD-10-CM

## 2025-07-07 DIAGNOSIS — E78.49 OTHER HYPERLIPIDEMIA: Primary | ICD-10-CM

## 2025-07-07 NOTE — TELEPHONE ENCOUNTER
Patient last seen 5/16/25 and provider wanted lab work done.    Patient went to lab on Saturday, 7/5/25, and there are no orders in the system.    Please add lab orders, and call patient back when completed so she can schedule a lab appointment.

## 2025-07-08 ENCOUNTER — APPOINTMENT (OUTPATIENT)
Dept: LAB | Facility: HOSPITAL | Age: 85
End: 2025-07-08
Payer: MEDICARE

## 2025-07-08 ENCOUNTER — OFFICE VISIT (OUTPATIENT)
Dept: HEMATOLOGY ONCOLOGY | Facility: CLINIC | Age: 85
End: 2025-07-08
Payer: MEDICARE

## 2025-07-08 VITALS
WEIGHT: 148 LBS | HEIGHT: 60 IN | OXYGEN SATURATION: 100 % | BODY MASS INDEX: 29.06 KG/M2 | HEART RATE: 66 BPM | SYSTOLIC BLOOD PRESSURE: 138 MMHG | TEMPERATURE: 97 F | DIASTOLIC BLOOD PRESSURE: 80 MMHG | RESPIRATION RATE: 18 BRPM

## 2025-07-08 DIAGNOSIS — R91.1 NODULE OF LOWER LOBE OF LEFT LUNG: ICD-10-CM

## 2025-07-08 DIAGNOSIS — R93.5 ABNORMAL CT OF THE ABDOMEN: ICD-10-CM

## 2025-07-08 DIAGNOSIS — C18.2 MALIGNANT NEOPLASM OF ASCENDING COLON (HCC): Primary | ICD-10-CM

## 2025-07-08 DIAGNOSIS — I10 PRIMARY HYPERTENSION: ICD-10-CM

## 2025-07-08 DIAGNOSIS — C18.2 MALIGNANT NEOPLASM OF ASCENDING COLON (HCC): ICD-10-CM

## 2025-07-08 PROCEDURE — G2211 COMPLEX E/M VISIT ADD ON: HCPCS | Performed by: INTERNAL MEDICINE

## 2025-07-08 PROCEDURE — 99215 OFFICE O/P EST HI 40 MIN: CPT | Performed by: INTERNAL MEDICINE

## 2025-07-08 PROCEDURE — 36415 COLL VENOUS BLD VENIPUNCTURE: CPT

## 2025-07-12 ENCOUNTER — APPOINTMENT (OUTPATIENT)
Dept: LAB | Facility: HOSPITAL | Age: 85
End: 2025-07-12
Payer: MEDICARE

## 2025-07-12 DIAGNOSIS — R35.0 URINARY FREQUENCY: ICD-10-CM

## 2025-07-12 DIAGNOSIS — E78.49 OTHER HYPERLIPIDEMIA: ICD-10-CM

## 2025-07-12 LAB
CHOLEST SERPL-MCNC: 126 MG/DL (ref ?–200)
HDLC SERPL-MCNC: 56 MG/DL
LDLC SERPL CALC-MCNC: 53 MG/DL (ref 0–100)
TRIGL SERPL-MCNC: 83 MG/DL (ref ?–150)

## 2025-07-12 PROCEDURE — 80061 LIPID PANEL: CPT

## 2025-07-12 PROCEDURE — 36415 COLL VENOUS BLD VENIPUNCTURE: CPT

## 2025-07-14 ENCOUNTER — APPOINTMENT (OUTPATIENT)
Dept: LAB | Facility: HOSPITAL | Age: 85
End: 2025-07-14
Payer: MEDICARE

## 2025-07-14 ENCOUNTER — RESULTS FOLLOW-UP (OUTPATIENT)
Age: 85
End: 2025-07-14

## 2025-07-14 LAB
BACTERIA UR QL AUTO: ABNORMAL /HPF
BILIRUB UR QL STRIP: NEGATIVE
CLARITY UR: CLEAR
COLOR UR: COLORLESS
GLUCOSE UR STRIP-MCNC: NEGATIVE MG/DL
HGB UR QL STRIP.AUTO: ABNORMAL
KETONES UR STRIP-MCNC: NEGATIVE MG/DL
LEUKOCYTE ESTERASE UR QL STRIP: ABNORMAL
NITRITE UR QL STRIP: NEGATIVE
NON-SQ EPI CELLS URNS QL MICRO: ABNORMAL /HPF
PH UR STRIP.AUTO: 7 [PH]
PROT UR STRIP-MCNC: NEGATIVE MG/DL
RBC #/AREA URNS AUTO: ABNORMAL /HPF
SP GR UR STRIP.AUTO: 1 (ref 1–1.03)
UROBILINOGEN UR STRIP-ACNC: <2 MG/DL
WBC #/AREA URNS AUTO: ABNORMAL /HPF

## 2025-07-14 PROCEDURE — 81001 URINALYSIS AUTO W/SCOPE: CPT

## 2025-07-14 NOTE — TELEPHONE ENCOUNTER
----- Message from Salo Calvillo MD sent at 7/14/2025  8:27 AM EDT -----  Reviewed. Nonurgent, discuss at upcoming appt  ----- Message -----  From: Lab, Background User  Sent: 7/12/2025  10:57 AM EDT  To: Salo Calvillo MD

## 2025-07-14 NOTE — TELEPHONE ENCOUNTER
Patient called for lab results    Relayed results to patient as per providers message below:    Salo Calvillo MD to [facility]      7/14/25  8:27 AM  Result Note  Reviewed. Nonurgent, discuss at upcoming appt  Lipid Panel with Direct LDL reflex    Patient verbalized understanding no questions or concerns at this time.    Result complete

## 2025-07-17 ENCOUNTER — OFFICE VISIT (OUTPATIENT)
Age: 85
End: 2025-07-17
Payer: MEDICARE

## 2025-07-17 VITALS
SYSTOLIC BLOOD PRESSURE: 128 MMHG | RESPIRATION RATE: 18 BRPM | HEART RATE: 64 BPM | WEIGHT: 149.8 LBS | OXYGEN SATURATION: 99 % | DIASTOLIC BLOOD PRESSURE: 62 MMHG | BODY MASS INDEX: 29.41 KG/M2 | HEIGHT: 60 IN | TEMPERATURE: 98 F

## 2025-07-17 DIAGNOSIS — R73.03 PREDIABETES: ICD-10-CM

## 2025-07-17 DIAGNOSIS — R31.29 MICROSCOPIC HEMATURIA: ICD-10-CM

## 2025-07-17 DIAGNOSIS — R93.5 ABNORMAL CT OF THE ABDOMEN: ICD-10-CM

## 2025-07-17 DIAGNOSIS — E78.49 OTHER HYPERLIPIDEMIA: ICD-10-CM

## 2025-07-17 DIAGNOSIS — K52.9 CHRONIC DIARRHEA: ICD-10-CM

## 2025-07-17 DIAGNOSIS — I10 PRIMARY HYPERTENSION: Primary | ICD-10-CM

## 2025-07-17 PROCEDURE — 99214 OFFICE O/P EST MOD 30 MIN: CPT | Performed by: STUDENT IN AN ORGANIZED HEALTH CARE EDUCATION/TRAINING PROGRAM

## 2025-07-17 PROCEDURE — G2211 COMPLEX E/M VISIT ADD ON: HCPCS | Performed by: STUDENT IN AN ORGANIZED HEALTH CARE EDUCATION/TRAINING PROGRAM

## 2025-07-17 RX ORDER — METFORMIN HYDROCHLORIDE 500 MG/1
500 TABLET, EXTENDED RELEASE ORAL
Qty: 90 TABLET | Refills: 1 | Status: SHIPPED | OUTPATIENT
Start: 2025-07-17

## 2025-07-17 NOTE — ASSESSMENT & PLAN NOTE
A1c 6.1 two months ago. Chronically on metformin IR. Will switch to metformin  mg qd to see if these improve her symptoms.    Orders:    metFORMIN (GLUCOPHAGE-XR) 500 mg 24 hr tablet; Take 1 tablet (500 mg total) by mouth daily with dinner

## 2025-07-17 NOTE — PROGRESS NOTES
Name: Delores Crespo      : 1940      MRN: [id number]  Encounter Provider: Salo Calvillo MD  Encounter Date: 2025   Encounter department: [facility]  :  Assessment & Plan  Primary hypertension  BP well controlled.  Continue losartan 50 mg qd  Continue Toprol XL 25 mg qd       Chronic diarrhea  Ongoing for nearly a year. 3 loose BM per day. Has seen GI for this. Was recommended to take fiber supplement and probiotic, which she has been doing. This may be patient's normal and not necessarily indication of any underlying pathology. Discussed this with patient. She has concerns about stool infection though we discussed this is less likely given the duration of her symptoms. Had recent CT CAP that does not show any significant findings in the bowel. She has history of colon cancer s/p partial right hemicolectomy but do not think that this is indicative of any concern for underlying malignant etiology. Recommend GI follow up. Will switch her metformin from IR to XR to see if this improves her symptoms.      Orders:    Stool Enteric Bacterial Panel by PCR; Future    Clostridioides difficile toxin by PCR with EIA; Future    Ova and parasite examination; Future    Other hyperlipidemia  Lipids very well controlled. Continue simvastatin 80 mg qd.       Microscopic hematuria  Had RBC 2-4 in urine 2 months ago but recent UA showing RBC 1-2. No repeat testing at this time. Will continue to monitor.       Prediabetes  A1c 6.1 two months ago. Chronically on metformin IR. Will switch to metformin  mg qd to see if these improve her symptoms.    Orders:    metFORMIN (GLUCOPHAGE-XR) 500 mg 24 hr tablet; Take 1 tablet (500 mg total) by mouth daily with dinner    Abnormal CT of the abdomen  Recent CT CAP done by Oncology concerning for sclerotic lesions in C7-T2 vertebrae. She is pending PET CT to follow up on these lesions.              History of Present Illness   Delores Crespo is an 83 yo  F with PMH of colon cancer s/p right hemicolectomy, HTN, HLD, ROJELIO, thyroid nodule, lung nodule, prediabetes and hypothyroidism who presents for follow-up.  She continues to complain of diarrhea with 3 loose stools per day.  This has been going on for most a year now.  She is taking Metamucil and probiotic.      Review of Systems   Respiratory:  Negative for cough and shortness of breath.    Cardiovascular:  Negative for chest pain.   Gastrointestinal:  Positive for diarrhea. Negative for abdominal pain, blood in stool and nausea.   Genitourinary:  Negative for difficulty urinating, dysuria and hematuria.       Objective   /62 (BP Location: Left arm, Patient Position: Sitting, Cuff Size: Standard)   Pulse 64   Temp 98 °F (36.7 °C) (Tympanic)   Resp 18   Ht 5' (1.524 m)   Wt 67.9 kg (149 lb 12.8 oz)   SpO2 99%   BMI 29.26 kg/m²      Physical Exam  Constitutional:       General: She is not in acute distress.    Eyes:      Conjunctiva/sclera: Conjunctivae normal.       Cardiovascular:      Rate and Rhythm: Normal rate and regular rhythm.      Heart sounds: Normal heart sounds.   Pulmonary:      Effort: Pulmonary effort is normal.      Breath sounds: Normal breath sounds.   Abdominal:      General: There is no distension.      Tenderness: There is no abdominal tenderness.     Musculoskeletal:      Right lower leg: No edema.      Left lower leg: No edema.     Skin:     General: Skin is warm and dry.     Neurological:      Mental Status: She is alert.     Psychiatric:         Speech: Speech normal.         Behavior: Behavior normal. Behavior is cooperative.

## 2025-07-18 DIAGNOSIS — E78.49 OTHER HYPERLIPIDEMIA: ICD-10-CM

## 2025-07-18 RX ORDER — SIMVASTATIN 80 MG
80 TABLET ORAL
Qty: 90 TABLET | Refills: 1 | Status: SHIPPED | OUTPATIENT
Start: 2025-07-18

## 2025-07-21 DIAGNOSIS — I10 PRIMARY HYPERTENSION: ICD-10-CM

## 2025-07-21 RX ORDER — METOPROLOL SUCCINATE 25 MG/1
25 TABLET, EXTENDED RELEASE ORAL DAILY
Qty: 90 TABLET | Refills: 1 | Status: SHIPPED | OUTPATIENT
Start: 2025-07-21

## 2025-07-22 ENCOUNTER — HOSPITAL ENCOUNTER (OUTPATIENT)
Dept: RADIOLOGY | Age: 85
Discharge: HOME | End: 2025-07-22
Attending: INTERNAL MEDICINE

## 2025-07-22 ENCOUNTER — HOSPITAL ENCOUNTER (OUTPATIENT)
Dept: RADIOLOGY | Age: 85
Discharge: HOME | End: 2025-07-22
Attending: INTERNAL MEDICINE
Payer: MEDICARE

## 2025-07-22 DIAGNOSIS — C18.2 MALIGNANT NEOPLASM OF ASCENDING COLON (HCC): ICD-10-CM

## 2025-07-22 DIAGNOSIS — R93.5 ABNORMAL CT OF THE ABDOMEN: ICD-10-CM

## 2025-07-22 LAB — GLUCOSE SERPL-MCNC: 94 MG/DL (ref 65–140)

## 2025-07-22 PROCEDURE — A9552 F18 FDG: HCPCS

## 2025-07-22 PROCEDURE — 78815 PET IMAGE W/CT SKULL-THIGH: CPT

## 2025-07-22 PROCEDURE — 82948 REAGENT STRIP/BLOOD GLUCOSE: CPT

## 2025-07-28 ENCOUNTER — APPOINTMENT (OUTPATIENT)
Dept: LAB | Facility: HOSPITAL | Age: 85
End: 2025-07-28
Payer: MEDICARE

## 2025-07-28 DIAGNOSIS — K52.9 CHRONIC DIARRHEA: ICD-10-CM

## 2025-07-28 PROCEDURE — 87209 SMEAR COMPLEX STAIN: CPT

## 2025-07-28 PROCEDURE — 87177 OVA AND PARASITES SMEARS: CPT

## 2025-07-29 ENCOUNTER — APPOINTMENT (OUTPATIENT)
Dept: LAB | Facility: HOSPITAL | Age: 85
End: 2025-07-29
Payer: MEDICARE

## 2025-07-29 PROCEDURE — 87505 NFCT AGENT DETECTION GI: CPT

## 2025-08-01 ENCOUNTER — RESULTS FOLLOW-UP (OUTPATIENT)
Age: 85
End: 2025-08-01

## 2025-08-05 DIAGNOSIS — K21.9 GASTROESOPHAGEAL REFLUX DISEASE, UNSPECIFIED WHETHER ESOPHAGITIS PRESENT: ICD-10-CM

## 2025-08-06 RX ORDER — FAMOTIDINE 20 MG/1
20 TABLET, FILM COATED ORAL DAILY
Qty: 90 TABLET | Refills: 1 | Status: SHIPPED | OUTPATIENT
Start: 2025-08-06

## 2025-08-12 ENCOUNTER — TELEPHONE (OUTPATIENT)
Age: 85
End: 2025-08-12